# Patient Record
Sex: MALE | Race: BLACK OR AFRICAN AMERICAN | NOT HISPANIC OR LATINO | Employment: FULL TIME | URBAN - METROPOLITAN AREA
[De-identification: names, ages, dates, MRNs, and addresses within clinical notes are randomized per-mention and may not be internally consistent; named-entity substitution may affect disease eponyms.]

---

## 2024-06-16 ENCOUNTER — APPOINTMENT (OUTPATIENT)
Dept: CARDIOLOGY | Facility: HOSPITAL | Age: 49
DRG: 282 | End: 2024-06-16
Payer: COMMERCIAL

## 2024-06-16 ENCOUNTER — HOSPITAL ENCOUNTER (INPATIENT)
Facility: HOSPITAL | Age: 49
DRG: 282 | End: 2024-06-16
Attending: STUDENT IN AN ORGANIZED HEALTH CARE EDUCATION/TRAINING PROGRAM | Admitting: INTERNAL MEDICINE
Payer: COMMERCIAL

## 2024-06-16 ENCOUNTER — APPOINTMENT (OUTPATIENT)
Dept: RADIOLOGY | Facility: HOSPITAL | Age: 49
DRG: 282 | End: 2024-06-16
Payer: COMMERCIAL

## 2024-06-16 VITALS
DIASTOLIC BLOOD PRESSURE: 110 MMHG | SYSTOLIC BLOOD PRESSURE: 153 MMHG | BODY MASS INDEX: 30.36 KG/M2 | OXYGEN SATURATION: 99 % | TEMPERATURE: 98.4 F | RESPIRATION RATE: 18 BRPM | WEIGHT: 205 LBS | HEART RATE: 103 BPM | HEIGHT: 69 IN

## 2024-06-16 DIAGNOSIS — E78.2 MIXED HYPERLIPIDEMIA: ICD-10-CM

## 2024-06-16 DIAGNOSIS — R06.02 SOB (SHORTNESS OF BREATH): ICD-10-CM

## 2024-06-16 DIAGNOSIS — R06.00 DYSPNEA, UNSPECIFIED: ICD-10-CM

## 2024-06-16 DIAGNOSIS — I47.10 SVT (SUPRAVENTRICULAR TACHYCARDIA) (CMS-HCC): Primary | ICD-10-CM

## 2024-06-16 DIAGNOSIS — I10 HYPERTENSION, UNSPECIFIED TYPE: ICD-10-CM

## 2024-06-16 DIAGNOSIS — M62.838 MUSCLE SPASM: ICD-10-CM

## 2024-06-16 DIAGNOSIS — R06.09 OTHER FORMS OF DYSPNEA: ICD-10-CM

## 2024-06-16 LAB
ANION GAP SERPL CALC-SCNC: 13 MMOL/L (ref 10–20)
BASOPHILS # BLD AUTO: 0.04 X10*3/UL (ref 0–0.1)
BASOPHILS NFR BLD AUTO: 0.5 %
BNP SERPL-MCNC: 105 PG/ML (ref 0–99)
BUN SERPL-MCNC: 15 MG/DL (ref 6–23)
CALCIUM SERPL-MCNC: 8.3 MG/DL (ref 8.6–10.3)
CARDIAC TROPONIN I PNL SERPL HS: 38 NG/L (ref 0–20)
CARDIAC TROPONIN I PNL SERPL HS: 54 NG/L (ref 0–20)
CARDIAC TROPONIN I PNL SERPL HS: 89 NG/L (ref 0–20)
CHLORIDE SERPL-SCNC: 106 MMOL/L (ref 98–107)
CO2 SERPL-SCNC: 24 MMOL/L (ref 21–32)
CREAT SERPL-MCNC: 1.52 MG/DL (ref 0.5–1.3)
D DIMER PPP FEU-MCNC: 787 NG/ML FEU
EGFRCR SERPLBLD CKD-EPI 2021: 56 ML/MIN/1.73M*2
EOSINOPHIL # BLD AUTO: 0.05 X10*3/UL (ref 0–0.7)
EOSINOPHIL NFR BLD AUTO: 0.7 %
ERYTHROCYTE [DISTWIDTH] IN BLOOD BY AUTOMATED COUNT: 15.9 % (ref 11.5–14.5)
GLUCOSE SERPL-MCNC: 175 MG/DL (ref 74–99)
HCT VFR BLD AUTO: 45.8 % (ref 41–52)
HGB BLD-MCNC: 15.1 G/DL (ref 13.5–17.5)
IMM GRANULOCYTES # BLD AUTO: 0.02 X10*3/UL (ref 0–0.7)
IMM GRANULOCYTES NFR BLD AUTO: 0.3 % (ref 0–0.9)
LYMPHOCYTES # BLD AUTO: 1.6 X10*3/UL (ref 1.2–4.8)
LYMPHOCYTES NFR BLD AUTO: 21.2 %
MAGNESIUM SERPL-MCNC: 1.8 MG/DL (ref 1.6–2.4)
MCH RBC QN AUTO: 29.7 PG (ref 26–34)
MCHC RBC AUTO-ENTMCNC: 33 G/DL (ref 32–36)
MCV RBC AUTO: 90 FL (ref 80–100)
MONOCYTES # BLD AUTO: 0.52 X10*3/UL (ref 0.1–1)
MONOCYTES NFR BLD AUTO: 6.9 %
NEUTROPHILS # BLD AUTO: 5.33 X10*3/UL (ref 1.2–7.7)
NEUTROPHILS NFR BLD AUTO: 70.4 %
NRBC BLD-RTO: 0 /100 WBCS (ref 0–0)
PHOSPHATE SERPL-MCNC: 3.3 MG/DL (ref 2.5–4.9)
PLATELET # BLD AUTO: 252 X10*3/UL (ref 150–450)
POTASSIUM SERPL-SCNC: 4.1 MMOL/L (ref 3.5–5.3)
RBC # BLD AUTO: 5.08 X10*6/UL (ref 4.5–5.9)
SODIUM SERPL-SCNC: 139 MMOL/L (ref 136–145)
TSH SERPL-ACNC: 1.56 MIU/L (ref 0.44–3.98)
WBC # BLD AUTO: 7.6 X10*3/UL (ref 4.4–11.3)

## 2024-06-16 PROCEDURE — 83880 ASSAY OF NATRIURETIC PEPTIDE: CPT | Performed by: STUDENT IN AN ORGANIZED HEALTH CARE EDUCATION/TRAINING PROGRAM

## 2024-06-16 PROCEDURE — 2500000004 HC RX 250 GENERAL PHARMACY W/ HCPCS (ALT 636 FOR OP/ED): Performed by: STUDENT IN AN ORGANIZED HEALTH CARE EDUCATION/TRAINING PROGRAM

## 2024-06-16 PROCEDURE — 85025 COMPLETE CBC W/AUTO DIFF WBC: CPT | Performed by: STUDENT IN AN ORGANIZED HEALTH CARE EDUCATION/TRAINING PROGRAM

## 2024-06-16 PROCEDURE — 36415 COLL VENOUS BLD VENIPUNCTURE: CPT | Performed by: STUDENT IN AN ORGANIZED HEALTH CARE EDUCATION/TRAINING PROGRAM

## 2024-06-16 PROCEDURE — 2500000004 HC RX 250 GENERAL PHARMACY W/ HCPCS (ALT 636 FOR OP/ED): Performed by: NURSE PRACTITIONER

## 2024-06-16 PROCEDURE — 2500000002 HC RX 250 W HCPCS SELF ADMINISTERED DRUGS (ALT 637 FOR MEDICARE OP, ALT 636 FOR OP/ED): Performed by: NURSE PRACTITIONER

## 2024-06-16 PROCEDURE — 2500000001 HC RX 250 WO HCPCS SELF ADMINISTERED DRUGS (ALT 637 FOR MEDICARE OP): Performed by: NURSE PRACTITIONER

## 2024-06-16 PROCEDURE — 96372 THER/PROPH/DIAG INJ SC/IM: CPT | Performed by: INTERNAL MEDICINE

## 2024-06-16 PROCEDURE — 80048 BASIC METABOLIC PNL TOTAL CA: CPT | Performed by: STUDENT IN AN ORGANIZED HEALTH CARE EDUCATION/TRAINING PROGRAM

## 2024-06-16 PROCEDURE — 2500000001 HC RX 250 WO HCPCS SELF ADMINISTERED DRUGS (ALT 637 FOR MEDICARE OP): Performed by: STUDENT IN AN ORGANIZED HEALTH CARE EDUCATION/TRAINING PROGRAM

## 2024-06-16 PROCEDURE — 96361 HYDRATE IV INFUSION ADD-ON: CPT

## 2024-06-16 PROCEDURE — 84484 ASSAY OF TROPONIN QUANT: CPT | Performed by: NURSE PRACTITIONER

## 2024-06-16 PROCEDURE — 71046 X-RAY EXAM CHEST 2 VIEWS: CPT | Performed by: RADIOLOGY

## 2024-06-16 PROCEDURE — 96374 THER/PROPH/DIAG INJ IV PUSH: CPT | Mod: 59

## 2024-06-16 PROCEDURE — 83735 ASSAY OF MAGNESIUM: CPT | Performed by: STUDENT IN AN ORGANIZED HEALTH CARE EDUCATION/TRAINING PROGRAM

## 2024-06-16 PROCEDURE — 2500000001 HC RX 250 WO HCPCS SELF ADMINISTERED DRUGS (ALT 637 FOR MEDICARE OP): Performed by: INTERNAL MEDICINE

## 2024-06-16 PROCEDURE — 84100 ASSAY OF PHOSPHORUS: CPT | Performed by: STUDENT IN AN ORGANIZED HEALTH CARE EDUCATION/TRAINING PROGRAM

## 2024-06-16 PROCEDURE — 99222 1ST HOSP IP/OBS MODERATE 55: CPT | Performed by: INTERNAL MEDICINE

## 2024-06-16 PROCEDURE — 2500000004 HC RX 250 GENERAL PHARMACY W/ HCPCS (ALT 636 FOR OP/ED): Performed by: INTERNAL MEDICINE

## 2024-06-16 PROCEDURE — 84484 ASSAY OF TROPONIN QUANT: CPT | Performed by: STUDENT IN AN ORGANIZED HEALTH CARE EDUCATION/TRAINING PROGRAM

## 2024-06-16 PROCEDURE — 85379 FIBRIN DEGRADATION QUANT: CPT | Performed by: NURSE PRACTITIONER

## 2024-06-16 PROCEDURE — 99285 EMERGENCY DEPT VISIT HI MDM: CPT

## 2024-06-16 PROCEDURE — 84443 ASSAY THYROID STIM HORMONE: CPT | Performed by: STUDENT IN AN ORGANIZED HEALTH CARE EDUCATION/TRAINING PROGRAM

## 2024-06-16 PROCEDURE — 36415 COLL VENOUS BLD VENIPUNCTURE: CPT | Performed by: INTERNAL MEDICINE

## 2024-06-16 PROCEDURE — 93005 ELECTROCARDIOGRAM TRACING: CPT

## 2024-06-16 PROCEDURE — 1100000001 HC PRIVATE ROOM DAILY

## 2024-06-16 PROCEDURE — 71046 X-RAY EXAM CHEST 2 VIEWS: CPT

## 2024-06-16 RX ORDER — CLONIDINE HYDROCHLORIDE 0.2 MG/1
0.2 TABLET ORAL EVERY 8 HOURS SCHEDULED
Status: DISCONTINUED | OUTPATIENT
Start: 2024-06-16 | End: 2024-06-18 | Stop reason: HOSPADM

## 2024-06-16 RX ORDER — AMLODIPINE BESYLATE 10 MG/1
10 TABLET ORAL DAILY
Status: DISCONTINUED | OUTPATIENT
Start: 2024-06-16 | End: 2024-06-16

## 2024-06-16 RX ORDER — DILTIAZEM HYDROCHLORIDE 120 MG/1
240 CAPSULE, COATED, EXTENDED RELEASE ORAL DAILY
Status: DISCONTINUED | OUTPATIENT
Start: 2024-06-17 | End: 2024-06-18 | Stop reason: HOSPADM

## 2024-06-16 RX ORDER — LOSARTAN POTASSIUM 50 MG/1
50 TABLET ORAL DAILY
Status: DISCONTINUED | OUTPATIENT
Start: 2024-06-16 | End: 2024-06-17

## 2024-06-16 RX ORDER — NAPROXEN SODIUM 220 MG/1
324 TABLET, FILM COATED ORAL ONCE
Status: COMPLETED | OUTPATIENT
Start: 2024-06-16 | End: 2024-06-16

## 2024-06-16 RX ORDER — ACETAMINOPHEN 160 MG/5ML
650 SOLUTION ORAL EVERY 4 HOURS PRN
Status: DISCONTINUED | OUTPATIENT
Start: 2024-06-16 | End: 2024-06-18 | Stop reason: HOSPADM

## 2024-06-16 RX ORDER — PANTOPRAZOLE SODIUM 40 MG/10ML
40 INJECTION, POWDER, LYOPHILIZED, FOR SOLUTION INTRAVENOUS
Status: DISCONTINUED | OUTPATIENT
Start: 2024-06-16 | End: 2024-06-18 | Stop reason: HOSPADM

## 2024-06-16 RX ORDER — ATORVASTATIN CALCIUM 40 MG/1
40 TABLET, FILM COATED ORAL NIGHTLY
Status: DISCONTINUED | OUTPATIENT
Start: 2024-06-16 | End: 2024-06-16

## 2024-06-16 RX ORDER — ONDANSETRON 4 MG/1
4 TABLET, FILM COATED ORAL EVERY 8 HOURS PRN
Status: DISCONTINUED | OUTPATIENT
Start: 2024-06-16 | End: 2024-06-18 | Stop reason: HOSPADM

## 2024-06-16 RX ORDER — HYDRALAZINE HYDROCHLORIDE 20 MG/ML
10 INJECTION INTRAMUSCULAR; INTRAVENOUS ONCE
Status: COMPLETED | OUTPATIENT
Start: 2024-06-16 | End: 2024-06-16

## 2024-06-16 RX ORDER — ENOXAPARIN SODIUM 100 MG/ML
40 INJECTION SUBCUTANEOUS EVERY 24 HOURS
Status: DISCONTINUED | OUTPATIENT
Start: 2024-06-16 | End: 2024-06-18 | Stop reason: HOSPADM

## 2024-06-16 RX ORDER — SPIRONOLACTONE 25 MG/1
25 TABLET ORAL DAILY
Status: DISCONTINUED | OUTPATIENT
Start: 2024-06-16 | End: 2024-06-18 | Stop reason: HOSPADM

## 2024-06-16 RX ORDER — HYDRALAZINE HYDROCHLORIDE 20 MG/ML
10 INJECTION INTRAMUSCULAR; INTRAVENOUS EVERY 6 HOURS PRN
Status: DISCONTINUED | OUTPATIENT
Start: 2024-06-16 | End: 2024-06-18 | Stop reason: HOSPADM

## 2024-06-16 RX ORDER — NAPROXEN SODIUM 220 MG/1
81 TABLET, FILM COATED ORAL DAILY
Status: DISCONTINUED | OUTPATIENT
Start: 2024-06-16 | End: 2024-06-16

## 2024-06-16 RX ORDER — ONDANSETRON HYDROCHLORIDE 2 MG/ML
4 INJECTION, SOLUTION INTRAVENOUS EVERY 8 HOURS PRN
Status: DISCONTINUED | OUTPATIENT
Start: 2024-06-16 | End: 2024-06-18 | Stop reason: HOSPADM

## 2024-06-16 RX ORDER — PANTOPRAZOLE SODIUM 40 MG/1
40 TABLET, DELAYED RELEASE ORAL
Status: DISCONTINUED | OUTPATIENT
Start: 2024-06-16 | End: 2024-06-18 | Stop reason: HOSPADM

## 2024-06-16 RX ORDER — ACETAMINOPHEN 650 MG/1
650 SUPPOSITORY RECTAL EVERY 4 HOURS PRN
Status: DISCONTINUED | OUTPATIENT
Start: 2024-06-16 | End: 2024-06-18 | Stop reason: HOSPADM

## 2024-06-16 RX ORDER — ACETAMINOPHEN 325 MG/1
650 TABLET ORAL EVERY 4 HOURS PRN
Status: DISCONTINUED | OUTPATIENT
Start: 2024-06-16 | End: 2024-06-18 | Stop reason: HOSPADM

## 2024-06-16 RX ADMIN — ENOXAPARIN SODIUM 40 MG: 40 INJECTION SUBCUTANEOUS at 05:36

## 2024-06-16 RX ADMIN — AMLODIPINE BESYLATE 10 MG: 10 TABLET ORAL at 08:47

## 2024-06-16 RX ADMIN — PANTOPRAZOLE SODIUM 40 MG: 40 TABLET, DELAYED RELEASE ORAL at 06:36

## 2024-06-16 RX ADMIN — HYDRALAZINE HYDROCHLORIDE 10 MG: 20 INJECTION INTRAMUSCULAR; INTRAVENOUS at 17:23

## 2024-06-16 RX ADMIN — SODIUM CHLORIDE, POTASSIUM CHLORIDE, SODIUM LACTATE AND CALCIUM CHLORIDE 1000 ML: 600; 310; 30; 20 INJECTION, SOLUTION INTRAVENOUS at 01:18

## 2024-06-16 RX ADMIN — ASPIRIN 81 MG 324 MG: 81 TABLET ORAL at 02:26

## 2024-06-16 RX ADMIN — CLONIDINE HYDROCHLORIDE 0.2 MG: 0.2 TABLET ORAL at 20:43

## 2024-06-16 RX ADMIN — LOSARTAN POTASSIUM 50 MG: 50 TABLET, FILM COATED ORAL at 12:00

## 2024-06-16 RX ADMIN — HYDRALAZINE HYDROCHLORIDE 10 MG: 20 INJECTION INTRAMUSCULAR; INTRAVENOUS at 08:47

## 2024-06-16 RX ADMIN — SPIRONOLACTONE 25 MG: 25 TABLET ORAL at 12:00

## 2024-06-16 SDOH — SOCIAL STABILITY: SOCIAL INSECURITY: ARE THERE ANY APPARENT SIGNS OF INJURIES/BEHAVIORS THAT COULD BE RELATED TO ABUSE/NEGLECT?: NO

## 2024-06-16 SDOH — SOCIAL STABILITY: SOCIAL INSECURITY: HAVE YOU HAD ANY THOUGHTS OF HARMING ANYONE ELSE?: NO

## 2024-06-16 SDOH — ECONOMIC STABILITY: INCOME INSECURITY: IN THE LAST 12 MONTHS, WAS THERE A TIME WHEN YOU WERE NOT ABLE TO PAY THE MORTGAGE OR RENT ON TIME?: NO

## 2024-06-16 SDOH — SOCIAL STABILITY: SOCIAL INSECURITY: DO YOU FEEL UNSAFE GOING BACK TO THE PLACE WHERE YOU ARE LIVING?: NO

## 2024-06-16 SDOH — ECONOMIC STABILITY: HOUSING INSECURITY
IN THE LAST 12 MONTHS, WAS THERE A TIME WHEN YOU DID NOT HAVE A STEADY PLACE TO SLEEP OR SLEPT IN A SHELTER (INCLUDING NOW)?: NO

## 2024-06-16 SDOH — ECONOMIC STABILITY: HOUSING INSECURITY: IN THE LAST 12 MONTHS, HOW MANY PLACES HAVE YOU LIVED?: 1

## 2024-06-16 SDOH — ECONOMIC STABILITY: INCOME INSECURITY: HOW HARD IS IT FOR YOU TO PAY FOR THE VERY BASICS LIKE FOOD, HOUSING, MEDICAL CARE, AND HEATING?: NOT HARD AT ALL

## 2024-06-16 SDOH — SOCIAL STABILITY: SOCIAL INSECURITY: ABUSE: ADULT

## 2024-06-16 SDOH — ECONOMIC STABILITY: TRANSPORTATION INSECURITY
IN THE PAST 12 MONTHS, HAS THE LACK OF TRANSPORTATION KEPT YOU FROM MEDICAL APPOINTMENTS OR FROM GETTING MEDICATIONS?: NO

## 2024-06-16 SDOH — SOCIAL STABILITY: SOCIAL INSECURITY: HAVE YOU HAD THOUGHTS OF HARMING ANYONE ELSE?: NO

## 2024-06-16 SDOH — SOCIAL STABILITY: SOCIAL INSECURITY: DOES ANYONE TRY TO KEEP YOU FROM HAVING/CONTACTING OTHER FRIENDS OR DOING THINGS OUTSIDE YOUR HOME?: NO

## 2024-06-16 SDOH — ECONOMIC STABILITY: TRANSPORTATION INSECURITY
IN THE PAST 12 MONTHS, HAS LACK OF TRANSPORTATION KEPT YOU FROM MEETINGS, WORK, OR FROM GETTING THINGS NEEDED FOR DAILY LIVING?: NO

## 2024-06-16 SDOH — SOCIAL STABILITY: SOCIAL INSECURITY: WERE YOU ABLE TO COMPLETE ALL THE BEHAVIORAL HEALTH SCREENINGS?: YES

## 2024-06-16 SDOH — SOCIAL STABILITY: SOCIAL INSECURITY: ARE YOU OR HAVE YOU BEEN THREATENED OR ABUSED PHYSICALLY, EMOTIONALLY, OR SEXUALLY BY ANYONE?: NO

## 2024-06-16 SDOH — SOCIAL STABILITY: SOCIAL INSECURITY: HAS ANYONE EVER THREATENED TO HURT YOUR FAMILY OR YOUR PETS?: NO

## 2024-06-16 SDOH — SOCIAL STABILITY: SOCIAL INSECURITY: DO YOU FEEL ANYONE HAS EXPLOITED OR TAKEN ADVANTAGE OF YOU FINANCIALLY OR OF YOUR PERSONAL PROPERTY?: NO

## 2024-06-16 ASSESSMENT — PAIN SCALES - GENERAL
PAINLEVEL_OUTOF10: 0 - NO PAIN
PAINLEVEL_OUTOF10: 10 - WORST POSSIBLE PAIN
PAINLEVEL_OUTOF10: 0 - NO PAIN

## 2024-06-16 ASSESSMENT — ACTIVITIES OF DAILY LIVING (ADL)
WALKS IN HOME: INDEPENDENT
PATIENT'S MEMORY ADEQUATE TO SAFELY COMPLETE DAILY ACTIVITIES?: YES
HEARING - LEFT EAR: FUNCTIONAL
ADEQUATE_TO_COMPLETE_ADL: YES
BATHING: INDEPENDENT
TOILETING: INDEPENDENT
ASSISTIVE_DEVICE: EYEGLASSES
HEARING - RIGHT EAR: FUNCTIONAL
FEEDING YOURSELF: INDEPENDENT
JUDGMENT_ADEQUATE_SAFELY_COMPLETE_DAILY_ACTIVITIES: YES
DRESSING YOURSELF: INDEPENDENT
GROOMING: INDEPENDENT

## 2024-06-16 ASSESSMENT — COGNITIVE AND FUNCTIONAL STATUS - GENERAL
DAILY ACTIVITIY SCORE: 24
DAILY ACTIVITIY SCORE: 24
MOBILITY SCORE: 24
DAILY ACTIVITIY SCORE: 24
PATIENT BASELINE BEDBOUND: NO

## 2024-06-16 ASSESSMENT — LIFESTYLE VARIABLES
AUDIT TOTAL SCORE: 5
AUDIT-C TOTAL SCORE: 5
HOW OFTEN DURING THE LAST YEAR HAVE YOU FOUND THAT YOU WERE NOT ABLE TO STOP DRINKING ONCE YOU HAD STARTED: NEVER
HOW OFTEN DO YOU HAVE 6 OR MORE DRINKS ON ONE OCCASION: MONTHLY
HAS A RELATIVE, FRIEND, DOCTOR, OR ANOTHER HEALTH PROFESSIONAL EXPRESSED CONCERN ABOUT YOUR DRINKING OR SUGGESTED YOU CUT DOWN: NO
HOW OFTEN DO YOU HAVE A DRINK CONTAINING ALCOHOL: 2-3 TIMES A WEEK
HAVE YOU OR SOMEONE ELSE BEEN INJURED AS A RESULT OF YOUR DRINKING: NO
HOW OFTEN DURING THE LAST YEAR HAVE YOU FAILED TO DO WHAT WAS NORMALLY EXPECTED FROM YOU BECAUSE OF DRINKING: NEVER
HOW MANY STANDARD DRINKS CONTAINING ALCOHOL DO YOU HAVE ON A TYPICAL DAY: 1 OR 2
SKIP TO QUESTIONS 9-10: 0
HOW OFTEN DURING THE LAST YEAR HAVE YOU BEEN UNABLE TO REMEMBER WHAT HAPPENED THE NIGHT BEFORE BECAUSE YOU HAD BEEN DRINKING: NEVER
HOW OFTEN DURING THE LAST YEAR HAVE YOU NEEDED AN ALCOHOLIC DRINK FIRST THING IN THE MORNING TO GET YOURSELF GOING AFTER A NIGHT OF HEAVY DRINKING: NEVER
AUDIT-C TOTAL SCORE: 5
HOW OFTEN DURING THE LAST YEAR HAVE YOU HAD A FEELING OF GUILT OR REMORSE AFTER DRINKING: NEVER
AUDIT TOTAL SCORE: 0

## 2024-06-16 ASSESSMENT — COLUMBIA-SUICIDE SEVERITY RATING SCALE - C-SSRS
6. HAVE YOU EVER DONE ANYTHING, STARTED TO DO ANYTHING, OR PREPARED TO DO ANYTHING TO END YOUR LIFE?: NO
1. IN THE PAST MONTH, HAVE YOU WISHED YOU WERE DEAD OR WISHED YOU COULD GO TO SLEEP AND NOT WAKE UP?: NO
2. HAVE YOU ACTUALLY HAD ANY THOUGHTS OF KILLING YOURSELF?: NO

## 2024-06-16 ASSESSMENT — PATIENT HEALTH QUESTIONNAIRE - PHQ9
SUM OF ALL RESPONSES TO PHQ9 QUESTIONS 1 & 2: 0
2. FEELING DOWN, DEPRESSED OR HOPELESS: NOT AT ALL
1. LITTLE INTEREST OR PLEASURE IN DOING THINGS: NOT AT ALL

## 2024-06-16 ASSESSMENT — PAIN - FUNCTIONAL ASSESSMENT
PAIN_FUNCTIONAL_ASSESSMENT: 0-10

## 2024-06-16 ASSESSMENT — ENCOUNTER SYMPTOMS: SHORTNESS OF BREATH: 1

## 2024-06-16 NOTE — CARE PLAN
The patient's goals for the shift include      The clinical goals for the shift include Pt will remain safe throughout the shift    Over the shift, the patient did make progress toward the following goals.   Problem: Fall/Injury  Goal: Not fall by end of shift  Outcome: Progressing     Problem: Fall/Injury  Goal: Verbalize understanding of personal risk factors for fall in the hospital  Outcome: Progressing     Problem: Fall/Injury  Goal: Use assistive devices by end of the shift  Outcome: Progressing

## 2024-06-16 NOTE — CONSULTS
Inpatient consult to Cardiology  Consult performed by: Evelyn Tolentino, SWAPNIL-CNP  Consult ordered by: Dustin Mccarthy DO  Reason for consult: SVT, chest pain      History Of Present Illness:    Petr Baptiste is a 48 y.o. male with PMHx current smoker, SVT, HTN, JOSUE, polysubstance abuse significant for presenting with SVT .  He reported c/o palpations yesterday at work (works at [employer]) when walking up the stairs. Associated with sob, diaphoresis, midsternal chest pain. Symptoms resolved with rest, drinking cold water after about 15 minutes. Symptoms later recurred later during his shift. Symptoms did not resolve after about a half hour, so he decided to call EMS. EMS arrived and found him to be in SVT , given 6 mg Adenosine with conversion to SR.     He previously followed with Dr. Hooks at [facility], last visit 6/2023. At that time recommended LHC given c/o cp, sob. He was concerned about having a CVA during his LHC so he did not proceed with evaluation. He was lost to follow up. He reported difficulty affording his medications (including several antihypertensives: Losartan 100 mg daily, Metoprolol Tartrate 100 mg BID, Hydralazine 100 mg TID, Imdur 30 mg daily) and c/o cramping with them so he stopped taking them several months ago.   Event monitor worn May 2023 revealed sinus rhythm without any arrhythmias.  Stress test performed June 2023 was negative for ischemia by nuclear imaging.  There were horizontal downsloping 1 mm ST segment depressions in the precordial and inferior leads after Lexiscan infusion.  This was accompanied by chest tightness.  There is relieved with Aminophyllin.       In ED, BMP with elevated Scr 1.52 (1.31 5/2023); . TSH, CBC unremarkable. HsTrop 38/54/87. EKG on admit NSR, possible LAE, q waves in septal leads.  No acute ST ischemic changes.     Cardiac Medications: None prior to admit; off for several months; previously on Losartan 100 mg daily, Metoprolol Tartrate 100 mg  "BID, Hydralazine 100 mg TID, Imdur 30 mg daily    Social history: 1/2 PPD smoker, weekly marijuana, daily etoh (2 tall cans beer)    Pertinent cardiac family history: Heart problems; grandparents, maternal both  in their 30s; sister 33 with HTN, mother with HTN  Father  at age 46, unknown      Past Cardiology Tests (Last 3 Years):  EK24 107:               Echo 5/10/23 [facility]:  CONCLUSIONS:   - Exam indication: Syncope   - The left ventricle is normal in size. There is mild left ventricular   hypertrophy. Left ventricular systolic function is normal. EF = 64 ± 5% (2D   biplane) Grade I left ventricular diastolic dysfunction.   - The right ventricle is normal in size. Right ventricular systolic function is   normal.   - There are no significant valvular abnormalities.   - Estimated right ventricular systolic pressure is likely underestimated due to a   weak or incomplete tricuspid regurgitation signal and is, at least, 29 mmHg   consistent with normal pulmonary artery pressures. Estimated right atrial pressure    is 3 mmHg based on IVC assessment.   - The patient has not had a prior CC echocardiographic exam for comparison.       Electronically signed by David Hooks MD on 5/10/2023 at 3:42:57 PM     Ejection Fractions:  No results found for: \"EF\"  Cath:  No results found for this or any previous visit.    Stress Test:  [facility] 2023  Stress test performed 2023 was negative for ischemia by nuclear imaging. There were horizontal downsloping 1 mm ST segment depressions in the precordial and inferior leads after Lexiscan infusion. This was accompanied by chest tightness. There is relieved with Aminophyllin.      Cardiac Imaging:  No results found for this or any previous visit.      Past Medical History:  He has no past medical history on file.    Past Surgical History:  He has no past surgical history on file.      Social History:  He reports that he has been smoking cigarettes. He has never used " "smokeless tobacco. He reports current drug use. Drug: Marijuana. No history on file for alcohol use.    Family History:  No family history on file.     Allergies:  Penicillins    ROS:  10 point review of systems including (Constitutional, Eyes, ENMT, Respiratory, Cardiac, Gastrointestinal, Neurological, Psychiatric, and Hematologic) was performed and is otherwise negative.    Objective Data:  Last Recorded Vitals:  Vitals:    24 0600 24 0630 24 0700 24 0715   BP: (!) 194/110 (!) 185/129 (!) 171/150 (!) 180/109   BP Location:       Patient Position:       Pulse: 69 87 84 86   Resp: 18 18 18    Temp:       TempSrc:       SpO2: 94% 94% 97% 97%   Weight:       Height:         Medical Gas Therapy: None (Room air)  Weight  Av kg (205 lb)  Min: 93 kg (205 lb)  Max: 93 kg (205 lb)      LABS:  CMP:  Results from last 7 days   Lab Units 24  0113   SODIUM mmol/L 139   POTASSIUM mmol/L 4.1   CHLORIDE mmol/L 106   CO2 mmol/L 24   ANION GAP mmol/L 13   BUN mg/dL 15   CREATININE mg/dL 1.52*   EGFR mL/min/1.73m*2 56*   MAGNESIUM mg/dL 1.80     CBC:  Results from last 7 days   Lab Units 24  0113   WBC AUTO x10*3/uL 7.6   HEMOGLOBIN g/dL 15.1   HEMATOCRIT % 45.8   PLATELETS AUTO x10*3/uL 252   MCV fL 90     COAG:     ABO: No results found for: \"ABO\"  HEME/ENDO:  Results from last 7 days   Lab Units 24  0113   TSH mIU/L 1.56      CARDIAC:   Results from last 7 days   Lab Units 24  0213 24  0113   TROPHS ng/L 54* 38*   BNP pg/mL  --  105*             Last I/O:    Intake/Output Summary (Last 24 hours) at 2024 075  Last data filed at 2024 0218  Gross per 24 hour   Intake 999 ml   Output --   Net 999 ml     Net IO Since Admission: 999 mL [24 0751]      Imaging Results:  No results found.    Inpatient Medications:  Scheduled medications   Medication Dose Route Frequency    enoxaparin  40 mg subcutaneous q24h    pantoprazole  40 mg oral Daily before breakfast    Or "    pantoprazole  40 mg intravenous Daily before breakfast     PRN medications   Medication    acetaminophen    Or    acetaminophen    Or    acetaminophen    ondansetron    Or    ondansetron     Continuous Medications   Medication Dose Last Rate       Outpatient Medications:  No current outpatient medications    Physical Exam:  General:  Patient is awake, alert, and oriented.  Patient is in no acute distress.  HEENT:  Pupils equal and reactive.  Normocephalic.  Moist mucosa.    Neck:  No thyromegaly.  Normal Jugular Venous Pressure.  Cardiovascular:  Regular rate and rhythm.  Normal S1 and S2.  Pulmonary:  Clear to auscultation bilaterally.  Abdomen:  Soft. Non-tender.   Non-distended.  Positive bowel sounds.  Lower Extremities:  2+ pedal pulses. No LE edema.  Neurologic:  Cranial nerves intact.  No focal deficit.   Skin: Skin warm and dry, normal skin turgor.   Psychiatric: Normal affect.     Assessment/Plan   Petr Baptiste is a 48 y.o. male with PMHx current smoker, SVT, uncontrolled HTN (off medications at least 6 months), JOSUE, polysubstance abuse significant for presenting with SVT .  He reported c/o palpations yesterday at work (works at [employer]) when walking up the stairs. Associated with sob, diaphoresis, midsternal chest pain. Symptoms resolved with rest, drinking cold water after about 15 minutes. Symptoms later recurred later during his shift. Symptoms did not resolve after about a half hour, so he decided to call EMS. EMS arrived and found him to be in SVT , given 6 mg Adenosine with conversion to SR.     He previously followed with Dr. Hooks at [facility], last visit 6/2023. At that time recommended [facility] given c/o cp, sob. He was concerned about having a CVA during his LHC so he did not proceed with evaluation. He was lost to follow up. He reported difficulty affording his medications (including several antihypertensives: Losartan 100 mg daily, Metoprolol Tartrate 100 mg BID, Hydralazine 100 mg TID, Imdur  30 mg daily) and c/o cramping with them so he stopped taking them several months ago.   Event monitor worn May 2023 revealed sinus rhythm without any arrhythmias.  Stress test performed June 2023 was negative for ischemia by nuclear imaging.  There were horizontal downsloping 1 mm ST segment depressions in the precordial and inferior leads after Lexiscan infusion.  This was accompanied by chest tightness.  There is relieved with Aminophyllin.       In ED, BMP with elevated Scr 1.52 (1.31 5/2023); . TSH, CBC unremarkable. HsTrop 38/54/89. EKG on admit NSR, possible LAE.  Q waves in septal leads. No acute ST ischemic changes.         #SVT  -EMS rhythm strip reviewed. SVT , ?AVNRT. S/p 6 mg Adenosine IVP by EMS with conversion to SR.   -Now maintaining SR  -Not on any cardiac meds prior to admit  -Event monitor worn May 2023 at [facility] revealed sinus rhythm without any arrhythmias.      #HTN, uncontrolled  #Non-adherence to medications, He reported difficulty affording his medications (including several antihypertensives: Losartan 100 mg daily, Metoprolol Tartrate 100 mg BID, Hydralazine 100 mg TID, Imdur 30 mg daily) and c/o cramping with them so he stopped taking them several months ago.     #Elevated Troponin  #Abnormal EKG  HsTrop 38/54/89. EKG on admit NSR, possible LAE.  Q waves in septal leads. No acute ST ischemic changes.   Noted to have c/o cp during SVT episode, now resolved.  Elevated troponin may be 2/2 to type 2 MI demand/ischemia due to SVT, uncontrolled HTN; however unable to r/o type 1 given significant risk factors for CAD. Nuclear Stress at [facility] 6/2023: negative nuclear images, but noted to have 1 mm ST segment depressions in the precordial and inferior leads after Lexiscan infusion.  This was accompanied by chest tightness, relieved with Aminophyllin.  #CKD, Scr. 1.5 on admit; last lab work >1 year ago (5/2023) Scr 1.3.     Plan:   Start Diltiazem 240 mg daily  Start Losartan 50 mg daily,  Spironolactone 25 mg daily.   Discontinue Amlodipine 10 mg daily.   TTE ordered.   Once BP better controlled, would plan for ischemic evaluation with likely [facility] til at least Tuesday dependent on renal fx, BP.   Lifestyle modification (smoking, etoh cessation, weight loss, diet/exercise). Medication compliance and Cardiology follow up encouarged.  Will need to follow up with Cardiology ( vs [facility] (previously saw Dr. Hooks) within 4 weeks on discharge    Code Status:  Full Code        Evelyn Tolentino, SWAPNIL-CNP    STAFF ADDENDUM:    Both the DANE and I have had a face to face encounter with the patient today. I have examined the patient and edited the documented physical examination as necessary.  I personally reviewed the patient's vital signs, telemetry, recent labs, medications, orders, EKGs, and pertinent cardiac imaging/ echocardiography.  I have reviewed the DANE's encounter note, approve the DANE's documentation and have edited the note to reflect my diagnostic and therapeutic plan.      1.  Paroxysmal SVT  - Possible AVNRT.  Resolved with adenosine.  Long timeframe between episodes (at least 5 years)  - Start diltiazem.  -If recurrent symptoms within short period of time would consider EP consultation for ablation    2.  Hypertensive urgency  - Very poor control.  Noncompliant with medications.  - Propose resuming some medications and altering therapy to include losartan, spironolactone, diltiazem and uptitrate from there.  Suspect he may need 4 or 5 alternative medications  -Plasma metanephrines ordered  - Outpatient renal artery ultrasound recommended as well    3.  Elevated troponin  - Likely type II demand MI in the setting of SVT and hypertensive urgency  - Does have history of ischemic EKG changes on stress test in 2023 and says C was deferred due to severe HTN.  -echo pending  - I would like to do coronary CT angiogram but worried about high dye load in the setting of poorly controlled hypertension  which might cause flash pulmonary edema, therefore would consider left heart catheterization in 2 to 3 days after better blood pressure control.  He may not agree to stay that long and if so would consider outpatient if he will ever agree to it.    Pedro Iraheta, DO

## 2024-06-16 NOTE — ED TRIAGE NOTES
Patient arrives from home via EMS with complaints of elevated heart rate. EMS states patients rate was 205 initially, EMS administered 6 mg adenosine IV. EMS placed 20g IV left AC. Patient NSR-ST upon presentation to ED. Patient states no symptoms at this time. No CP/SOB/dizziness.

## 2024-06-16 NOTE — ED PROVIDER NOTES
HPI:    History provided by: Patient    48-year-old male past medical history of SVT who presents to the emergency department for rapid heart rate.  He began to develop some shortness of breath when he was walking upstairs at work today.  Was severely short of breath to the point where he had to sit down.  After that, he felt like his heart was racing out of his chest.  Prompted workers to call 911.  When EMS arrived, patient was found to be in SVT and was given a dose of adenosine with conversion to normal sinus rhythm after 6 mg of adenosine.  Patient states this happened to him approximately 5 to 10 years ago, and he was discharged from the emergency department and did not get placed on any medications and has not been taking anything.  He denies any chest pain prior to this.  Does state that he has an early family history of dying with multiple family members dying before they were 50 years old.  Unclear what the etiology behind these deaths were.  Has been feeling well prior to today.      ROS: All pertinent positives and negatives reviewed in HPI    PMHx: Reviewed  PSHx: Reviewed  Social: no Etoh, no tobacco, no social drugs  Social Determinants of Health impacting care: NA  Allergies: Reviewed in EMR  FMHx: Noncontributory to patient's chief complaint  Medications: Reviewed        Vital signs and triage note reviewed per nursing documentation    Physical Exam:     GEN: Sitting in no acute distress. Well-appearing, appears stated age  HEAD: Atraumatic, normocephalic  EYES: EOMI. Pupils equal and reactive.   HEENT: MMM. No oropharyngeal erythema, exudates, or uvular deviation.   Neck: Supple, full ROM  CVS: Regular rate and rhythm, no murmurs, gallops, or rubs  PULM: Clear to auscultation bilaterally. No wheezes, rales, rhonchi.   ABD: Soft, nontender to palpation. No rigidity, guarding, or tympany  EXT: +2 radial and DP pulses bilaterally. No pitting edema noted. No varicose veins  NEURO: Alert, keenly  responsive. Moving all 4 extremities spontaneously with normal strength. Normal sensation in all 4 extremities     Emergency Department Course    Medications Ordered: NA    EKG: tachycardic ventricular rate, regular rhythm, normal axis. [place], QRS, and QTC intervals within normal limits. Nonspecific st depressions/t wave changes in inferior leads    Impression: Sinus tachycardia with inferior ST depressions.       MDM    48-year-old male past medical history of SVT who presents to the emergency department for SVT.  He had some exertional dyspnea at work today, and EMS was called because he began to feel severe palpitations.  Was found to be SVT, had adenosine for EMS with conversion to normal sinus rhythm.  He has some nonspecific ST depressions in his inferior leads on his EKG with no previous comparison seen.  Did have some exertional dyspnea, given these ST changes concern for possible cardiac etiology behind the SVT that he had today.  As such, laboratory testing was sent to evaluate for electrolyte derangement or ischemia.  His troponin is mildly elevated at 38 for which she was given aspirin.  No repeat EKG changes from previous.  Also has an elevated creatinine with unclear baseline.  He is not hypoxic, no risk factors for PE so I am not concerned for pulmonary embolism currently.  No severe electrolyte derangement to precipitate an episode of SVT, and normal thyroid testing as well.  BNP very mildly elevated.  Given this patient's elevated troponin level, exertional dyspnea prior to this event occurring, and family history of early death along with EKG changes I do believe he should be admitted to the hospital for cardiac restratification as this could have been the precipitating factor behind his episode of SVT.  He will be admitted in stable condition.       Consultations:    NA    Medications Prescribed:    NA    Disposition:    Admitted    =         Leon Breaux MD  06/16/24 2089

## 2024-06-16 NOTE — H&P
History Of Present Illness  Petr Baptiste is a 48 y.o. male presenting with rapid heart rate. He was walking up stairs today at work and developed shortness of breath that required him to sit down. He then started feeling like his heart was racing and his coworkers to call 911. He was found to be in SVT and converted back to SR after adenosine was given by EMS.  He does endorse he will intermittently become SOB like he did last night, however it goes away. However yesterday he was also having CP when he would breath in and also felt like his heart was racing. He reports when EMS got to the scene his . Denies taking any meds at home     He denies any SOB or chest pain currently.      PMHX: HTN     Past Medical History  History reviewed. No pertinent past medical history.    Surgical History  History reviewed. No pertinent surgical history.     Social History  He reports that he has been smoking cigarettes. He has never used smokeless tobacco. He reports current drug use. Drug: Marijuana. No history on file for alcohol use.    Family History  No family history on file.     Allergies  Penicillins    Review of Systems   Respiratory:  Positive for shortness of breath.    Cardiovascular:  Positive for chest pain.   All other systems reviewed and are negative.       Physical Exam  Constitutional:       Appearance: Normal appearance.   Cardiovascular:      Rate and Rhythm: Normal rate and regular rhythm.      Pulses: Normal pulses.      Heart sounds: Normal heart sounds. No murmur heard.     No gallop.   Pulmonary:      Effort: Pulmonary effort is normal. No respiratory distress.      Breath sounds: Rhonchi present. No wheezing.   Abdominal:      General: Abdomen is flat. Bowel sounds are normal. There is no distension.      Palpations: Abdomen is soft.      Tenderness: There is no abdominal tenderness. There is no guarding.   Musculoskeletal:         General: Normal range of motion.   Skin:     General: Skin is  "warm.      Capillary Refill: Capillary refill takes less than 2 seconds.   Neurological:      Mental Status: He is alert and oriented to person, place, and time.   Psychiatric:         Mood and Affect: Mood normal.         Thought Content: Thought content normal.         Judgment: Judgment normal.          Last Recorded Vitals  Blood pressure (!) 180/109, pulse 86, temperature 36.6 °C (97.9 °F), temperature source Temporal, resp. rate 18, height 1.753 m (5' 9\"), weight 93 kg (205 lb), SpO2 97%.    Relevant Results  Scheduled medications  enoxaparin, 40 mg, subcutaneous, q24h  pantoprazole, 40 mg, oral, Daily before breakfast   Or  pantoprazole, 40 mg, intravenous, Daily before breakfast      Continuous medications     PRN medications  PRN medications: acetaminophen **OR** acetaminophen **OR** acetaminophen, ondansetron **OR** ondansetron  Results for orders placed or performed during the hospital encounter of 06/16/24 (from the past 24 hour(s))   CBC and Auto Differential   Result Value Ref Range    WBC 7.6 4.4 - 11.3 x10*3/uL    nRBC 0.0 0.0 - 0.0 /100 WBCs    RBC 5.08 4.50 - 5.90 x10*6/uL    Hemoglobin 15.1 13.5 - 17.5 g/dL    Hematocrit 45.8 41.0 - 52.0 %    MCV 90 80 - 100 fL    MCH 29.7 26.0 - 34.0 pg    MCHC 33.0 32.0 - 36.0 g/dL    RDW 15.9 (H) 11.5 - 14.5 %    Platelets 252 150 - 450 x10*3/uL    Neutrophils % 70.4 40.0 - 80.0 %    Immature Granulocytes %, Automated 0.3 0.0 - 0.9 %    Lymphocytes % 21.2 13.0 - 44.0 %    Monocytes % 6.9 2.0 - 10.0 %    Eosinophils % 0.7 0.0 - 6.0 %    Basophils % 0.5 0.0 - 2.0 %    Neutrophils Absolute 5.33 1.20 - 7.70 x10*3/uL    Immature Granulocytes Absolute, Automated 0.02 0.00 - 0.70 x10*3/uL    Lymphocytes Absolute 1.60 1.20 - 4.80 x10*3/uL    Monocytes Absolute 0.52 0.10 - 1.00 x10*3/uL    Eosinophils Absolute 0.05 0.00 - 0.70 x10*3/uL    Basophils Absolute 0.04 0.00 - 0.10 x10*3/uL   Basic metabolic panel   Result Value Ref Range    Glucose 175 (H) 74 - 99 mg/dL    " Sodium 139 136 - 145 mmol/L    Potassium 4.1 3.5 - 5.3 mmol/L    Chloride 106 98 - 107 mmol/L    Bicarbonate 24 21 - 32 mmol/L    Anion Gap 13 10 - 20 mmol/L    Urea Nitrogen 15 6 - 23 mg/dL    Creatinine 1.52 (H) 0.50 - 1.30 mg/dL    eGFR 56 (L) >60 mL/min/1.73m*2    Calcium 8.3 (L) 8.6 - 10.3 mg/dL   Phosphorus   Result Value Ref Range    Phosphorus 3.3 2.5 - 4.9 mg/dL   Magnesium   Result Value Ref Range    Magnesium 1.80 1.60 - 2.40 mg/dL   B-Type Natriuretic Peptide   Result Value Ref Range     (H) 0 - 99 pg/mL   TSH with reflex to Free T4 if abnormal   Result Value Ref Range    Thyroid Stimulating Hormone 1.56 0.44 - 3.98 mIU/L   Troponin I, High Sensitivity, Initial   Result Value Ref Range    Troponin I, High Sensitivity 38 (H) 0 - 20 ng/L   Troponin, High Sensitivity, 1 Hour   Result Value Ref Range    Troponin I, High Sensitivity 54 (HH) 0 - 20 ng/L     No results found.       Assessment/Plan   Principal Problem:    SVT (supraventricular tachycardia) ([facility])      Petr Baptiste is a 48 y.o. male presenting with rapid heart rate. He was walking up stairs today at work and developed shortness of breath that required him to sit down. He then started feeling like his heart was racing and his coworkers to call 911. He was found to be in SVT and converted back to SR after adenosine was given by EMS.  He does endorse he will intermittently become SOB like he did last night, however it goes away. However yesterday he was also having CP when he would breath in and also felt like his heart was racing. He reports when EMS got to the scene his . Denies taking any meds at home     He denies any SOB or chest pain currently.      PMHX: HTN     Chest pain/ SVT  -currently SR  -cardiology consulted, appreciate recs  -tele  -ALONSO 38 -> 54 -> 89  -CXR neg   -TSH WNL     HTN  -denies  taking any meds at home  -start amlodipine, trend. Can further adjust/add meds    Renal insufficiency  -creat 1.5 on admit.  Acute vs CKD, does have uncontrolled HTN as risk factor for progression   -creat 1.31 5/14/23   -avoid nephrotoxic agents        DVT prophylaxis:  Lovenox, SCD    DC plan:  DC home when medically stable    Labs/Testing reviewed    Interdisciplinary team rounding completed with hospitalist, nurse, TCC    NP discussed plan and lab/testing results with Dr. Nielson       Upgraded from obs to inpatient, hospitalist to assume care, notified via epic secure chat        I spent 45  minutes in the professional and overall care of this patient.      Monique Carroll, APRN-CNP

## 2024-06-16 NOTE — PROGRESS NOTES
Pharmacy Medication History Review~~~PATIENT STATES HE HASN'T TAKEN ANY MEDICATION IN ALMOST A YEAR DUE TO REACTIONS TO MEDS AND COST~~~~ WAS PREVIOUSLY ON THE FOLLOWING;  AMLODIPINE 10MG- 1 QD  CHLORTHALIDONE 25MG- 1QD  HYDRALAZINE 100MG- 1TID  ISOSORBIDE MONONITRATE ER 30MG- 1QD  LOSARTAN 100MG- 1QD  METOPROLOL TARTRATE 100MG- 1BID  THIAMINE 100MG- 1TID    Petr Baptiste is a 48 y.o. male admitted for SVT (supraventricular tachycardia) (CMS-HCC). Pharmacy reviewed the patient's phbwa-un-wzooisalt medications and allergies for accuracy.    The list below reflectives the updated PTA list. Please review each medication in order reconciliation for additional clarification and justification.  None           Severity Reactions Comments    Penicillins Not Specified Hives             Below are additional concerns with the patient's PTA list.  Patient states he isn't on and medication.    Gabrielle Gomez

## 2024-06-16 NOTE — CARE PLAN
The patient's goals for the shift include      The clinical goals for the shift include Pt will remain safe throughout the shift    Over the shift, the patient did  make progress toward the following goals.

## 2024-06-17 ENCOUNTER — APPOINTMENT (OUTPATIENT)
Dept: RADIOLOGY | Facility: HOSPITAL | Age: 49
DRG: 282 | End: 2024-06-17
Payer: COMMERCIAL

## 2024-06-17 ENCOUNTER — APPOINTMENT (OUTPATIENT)
Dept: CARDIOLOGY | Facility: HOSPITAL | Age: 49
DRG: 282 | End: 2024-06-17
Payer: COMMERCIAL

## 2024-06-17 PROBLEM — R06.02 SOB (SHORTNESS OF BREATH): Status: ACTIVE | Noted: 2024-06-17

## 2024-06-17 LAB
ANION GAP SERPL CALC-SCNC: 14 MMOL/L (ref 10–20)
AORTIC VALVE MEAN GRADIENT: 2.7 MMHG
AORTIC VALVE PEAK VELOCITY: 1.18 M/S
AV PEAK GRADIENT: 5.6 MMHG
AVA (PEAK VEL): 1.87 CM2
AVA (VTI): 1.95 CM2
BUN SERPL-MCNC: 11 MG/DL (ref 6–23)
CALCIUM SERPL-MCNC: 9.6 MG/DL (ref 8.6–10.3)
CHLORIDE SERPL-SCNC: 102 MMOL/L (ref 98–107)
CHOLEST SERPL-MCNC: 248 MG/DL (ref 0–199)
CHOLESTEROL/HDL RATIO: 4.4
CO2 SERPL-SCNC: 25 MMOL/L (ref 21–32)
CREAT SERPL-MCNC: 1.24 MG/DL (ref 0.5–1.3)
EGFRCR SERPLBLD CKD-EPI 2021: 71 ML/MIN/1.73M*2
EJECTION FRACTION APICAL 4 CHAMBER: 47.2
ERYTHROCYTE [DISTWIDTH] IN BLOOD BY AUTOMATED COUNT: 15.5 % (ref 11.5–14.5)
EST. AVERAGE GLUCOSE BLD GHB EST-MCNC: 123 MG/DL
GLOBAL LONGITUDINAL STRAIN: 12.3 %
GLUCOSE SERPL-MCNC: 97 MG/DL (ref 74–99)
HBA1C MFR BLD: 5.9 %
HCT VFR BLD AUTO: 47.2 % (ref 41–52)
HDLC SERPL-MCNC: 56.7 MG/DL
HGB BLD-MCNC: 16.2 G/DL (ref 13.5–17.5)
LDLC SERPL CALC-MCNC: 174 MG/DL
LEFT ATRIUM VOLUME AREA LENGTH INDEX BSA: 28.8 ML/M2
LEFT VENTRICLE INTERNAL DIMENSION DIASTOLE: 4.04 CM (ref 3.5–6)
LEFT VENTRICULAR OUTFLOW TRACT DIAMETER: 1.78 CM
LV EJECTION FRACTION BIPLANE: 50 %
MCH RBC QN AUTO: 30.3 PG (ref 26–34)
MCHC RBC AUTO-ENTMCNC: 34.3 G/DL (ref 32–36)
MCV RBC AUTO: 88 FL (ref 80–100)
MITRAL VALVE E/A RATIO: 1.07
MITRAL VALVE E/E' RATIO: 11.99
NON HDL CHOLESTEROL: 191 MG/DL (ref 0–149)
NRBC BLD-RTO: 0 /100 WBCS (ref 0–0)
PLATELET # BLD AUTO: 257 X10*3/UL (ref 150–450)
POTASSIUM SERPL-SCNC: 3.7 MMOL/L (ref 3.5–5.3)
RBC # BLD AUTO: 5.35 X10*6/UL (ref 4.5–5.9)
RIGHT VENTRICLE FREE WALL PEAK S': 12 CM/S
RIGHT VENTRICLE PEAK SYSTOLIC PRESSURE: 39.6 MMHG
SODIUM SERPL-SCNC: 137 MMOL/L (ref 136–145)
TRICUSPID ANNULAR PLANE SYSTOLIC EXCURSION: 1.5 CM
TRIGL SERPL-MCNC: 89 MG/DL (ref 0–149)
VLDL: 18 MG/DL (ref 0–40)
WBC # BLD AUTO: 7.4 X10*3/UL (ref 4.4–11.3)

## 2024-06-17 PROCEDURE — 2500000001 HC RX 250 WO HCPCS SELF ADMINISTERED DRUGS (ALT 637 FOR MEDICARE OP): Performed by: INTERNAL MEDICINE

## 2024-06-17 PROCEDURE — 78830 RP LOCLZJ TUM SPECT W/CT 1: CPT

## 2024-06-17 PROCEDURE — 99233 SBSQ HOSP IP/OBS HIGH 50: CPT | Performed by: INTERNAL MEDICINE

## 2024-06-17 PROCEDURE — 2500000002 HC RX 250 W HCPCS SELF ADMINISTERED DRUGS (ALT 637 FOR MEDICARE OP, ALT 636 FOR OP/ED): Performed by: NURSE PRACTITIONER

## 2024-06-17 PROCEDURE — 36415 COLL VENOUS BLD VENIPUNCTURE: CPT | Performed by: NURSE PRACTITIONER

## 2024-06-17 PROCEDURE — 78830 RP LOCLZJ TUM SPECT W/CT 1: CPT | Performed by: STUDENT IN AN ORGANIZED HEALTH CARE EDUCATION/TRAINING PROGRAM

## 2024-06-17 PROCEDURE — 2500000001 HC RX 250 WO HCPCS SELF ADMINISTERED DRUGS (ALT 637 FOR MEDICARE OP): Performed by: NURSE PRACTITIONER

## 2024-06-17 PROCEDURE — 75574 CT ANGIO HRT W/3D IMAGE: CPT

## 2024-06-17 PROCEDURE — 3430000001 HC RX 343 DIAGNOSTIC RADIOPHARMACEUTICALS: Performed by: INTERNAL MEDICINE

## 2024-06-17 PROCEDURE — 83036 HEMOGLOBIN GLYCOSYLATED A1C: CPT | Mod: AHULAB | Performed by: INTERNAL MEDICINE

## 2024-06-17 PROCEDURE — 2500000004 HC RX 250 GENERAL PHARMACY W/ HCPCS (ALT 636 FOR OP/ED): Performed by: NURSE PRACTITIONER

## 2024-06-17 PROCEDURE — 2500000004 HC RX 250 GENERAL PHARMACY W/ HCPCS (ALT 636 FOR OP/ED): Performed by: INTERNAL MEDICINE

## 2024-06-17 PROCEDURE — A9540 TC99M MAA: HCPCS | Performed by: INTERNAL MEDICINE

## 2024-06-17 PROCEDURE — 80061 LIPID PANEL: CPT | Performed by: INTERNAL MEDICINE

## 2024-06-17 PROCEDURE — 2550000001 HC RX 255 CONTRASTS: Performed by: INTERNAL MEDICINE

## 2024-06-17 PROCEDURE — 1200000002 HC GENERAL ROOM WITH TELEMETRY DAILY

## 2024-06-17 PROCEDURE — 93306 TTE W/DOPPLER COMPLETE: CPT

## 2024-06-17 PROCEDURE — 75574 CT ANGIO HRT W/3D IMAGE: CPT | Performed by: INTERNAL MEDICINE

## 2024-06-17 PROCEDURE — 80048 BASIC METABOLIC PNL TOTAL CA: CPT | Performed by: NURSE PRACTITIONER

## 2024-06-17 PROCEDURE — 85027 COMPLETE CBC AUTOMATED: CPT | Performed by: NURSE PRACTITIONER

## 2024-06-17 PROCEDURE — 93306 TTE W/DOPPLER COMPLETE: CPT | Performed by: INTERNAL MEDICINE

## 2024-06-17 RX ORDER — METOPROLOL TARTRATE 1 MG/ML
5 INJECTION, SOLUTION INTRAVENOUS ONCE AS NEEDED
Status: DISCONTINUED | OUTPATIENT
Start: 2024-06-17 | End: 2024-06-18

## 2024-06-17 RX ORDER — NITROGLYCERIN 0.4 MG/1
0.8 TABLET SUBLINGUAL ONCE
Status: COMPLETED | OUTPATIENT
Start: 2024-06-17 | End: 2024-06-17

## 2024-06-17 RX ORDER — CYCLOBENZAPRINE HCL 5 MG
5 TABLET ORAL 3 TIMES DAILY
Status: DISCONTINUED | OUTPATIENT
Start: 2024-06-17 | End: 2024-06-18 | Stop reason: HOSPADM

## 2024-06-17 RX ORDER — METOPROLOL TARTRATE 50 MG/1
100 TABLET ORAL ONCE AS NEEDED
Status: COMPLETED | OUTPATIENT
Start: 2024-06-17 | End: 2024-06-17

## 2024-06-17 RX ORDER — METOPROLOL TARTRATE 50 MG/1
100 TABLET ORAL ONCE
Status: COMPLETED | OUTPATIENT
Start: 2024-06-17 | End: 2024-06-17

## 2024-06-17 RX ORDER — LORAZEPAM 2 MG/ML
0.5 INJECTION INTRAMUSCULAR EVERY 5 MIN PRN
Status: DISCONTINUED | OUTPATIENT
Start: 2024-06-17 | End: 2024-06-18 | Stop reason: HOSPADM

## 2024-06-17 RX ORDER — METOPROLOL TARTRATE 1 MG/ML
5 INJECTION, SOLUTION INTRAVENOUS ONCE
Status: DISCONTINUED | OUTPATIENT
Start: 2024-06-17 | End: 2024-06-18

## 2024-06-17 RX ORDER — LOSARTAN POTASSIUM 50 MG/1
100 TABLET ORAL DAILY
Status: DISCONTINUED | OUTPATIENT
Start: 2024-06-17 | End: 2024-06-18 | Stop reason: HOSPADM

## 2024-06-17 RX ADMIN — PANTOPRAZOLE SODIUM 40 MG: 40 TABLET, DELAYED RELEASE ORAL at 06:34

## 2024-06-17 RX ADMIN — NITROGLYCERIN 0.8 MG: 0.4 TABLET SUBLINGUAL at 16:07

## 2024-06-17 RX ADMIN — ENOXAPARIN SODIUM 40 MG: 40 INJECTION SUBCUTANEOUS at 06:34

## 2024-06-17 RX ADMIN — LOSARTAN POTASSIUM 100 MG: 50 TABLET, FILM COATED ORAL at 11:23

## 2024-06-17 RX ADMIN — METOPROLOL TARTRATE 100 MG: 50 TABLET, FILM COATED ORAL at 11:22

## 2024-06-17 RX ADMIN — CYCLOBENZAPRINE HYDROCHLORIDE 5 MG: 5 TABLET, FILM COATED ORAL at 21:04

## 2024-06-17 RX ADMIN — CLONIDINE HYDROCHLORIDE 0.2 MG: 0.2 TABLET ORAL at 06:34

## 2024-06-17 RX ADMIN — DILTIAZEM HYDROCHLORIDE 240 MG: 120 CAPSULE, EXTENDED RELEASE ORAL at 08:16

## 2024-06-17 RX ADMIN — METOPROLOL TARTRATE 100 MG: 50 TABLET, FILM COATED ORAL at 13:01

## 2024-06-17 RX ADMIN — CLONIDINE HYDROCHLORIDE 0.2 MG: 0.2 TABLET ORAL at 15:03

## 2024-06-17 RX ADMIN — KIT FOR THE PREPARATION OF TECHNETIUM TC 99M ALBUMIN AGGREGATED 4.3 MILLICURIE: 2.5 INJECTION, POWDER, FOR SOLUTION INTRAVENOUS at 09:18

## 2024-06-17 RX ADMIN — CLONIDINE HYDROCHLORIDE 0.2 MG: 0.2 TABLET ORAL at 21:04

## 2024-06-17 RX ADMIN — IOHEXOL 75 ML: 350 INJECTION, SOLUTION INTRAVENOUS at 16:07

## 2024-06-17 RX ADMIN — SPIRONOLACTONE 25 MG: 25 TABLET ORAL at 08:16

## 2024-06-17 RX ADMIN — HYDRALAZINE HYDROCHLORIDE 10 MG: 20 INJECTION INTRAMUSCULAR; INTRAVENOUS at 04:40

## 2024-06-17 SDOH — HEALTH STABILITY: MENTAL HEALTH: HOW OFTEN DO YOU HAVE A DRINK CONTAINING ALCOHOL?: MONTHLY OR LESS

## 2024-06-17 SDOH — ECONOMIC STABILITY: INCOME INSECURITY: IN THE LAST 12 MONTHS, WAS THERE A TIME WHEN YOU WERE NOT ABLE TO PAY THE MORTGAGE OR RENT ON TIME?: NO

## 2024-06-17 SDOH — HEALTH STABILITY: MENTAL HEALTH: HOW MANY STANDARD DRINKS CONTAINING ALCOHOL DO YOU HAVE ON A TYPICAL DAY?: 3 OR 4

## 2024-06-17 SDOH — ECONOMIC STABILITY: INCOME INSECURITY: HOW HARD IS IT FOR YOU TO PAY FOR THE VERY BASICS LIKE FOOD, HOUSING, MEDICAL CARE, AND HEATING?: NOT VERY HARD

## 2024-06-17 ASSESSMENT — ACTIVITIES OF DAILY LIVING (ADL): LACK_OF_TRANSPORTATION: NO

## 2024-06-17 ASSESSMENT — PAIN - FUNCTIONAL ASSESSMENT
PAIN_FUNCTIONAL_ASSESSMENT: 0-10
PAIN_FUNCTIONAL_ASSESSMENT: 0-10

## 2024-06-17 ASSESSMENT — PAIN SCALES - GENERAL
PAINLEVEL_OUTOF10: 0 - NO PAIN

## 2024-06-17 NOTE — PROGRESS NOTES
"Subjective Data:  No further arrhythmias or SVT overnight  BP better control.  Started on clonidine by primary team as well.  Biggest complaint is irritability and feels like muscle aches and spasms.  Says this happened last time when was on certain blood pressure medications.  Cannot recall if one of them caused it.  Elevated D-dimer and VQ scan ordered for today.       Objective Data:  Last Recorded Vitals:  Vitals:    24 2300 24 0322 24 0545 24 0731   BP: (!) 153/110 (!) 181/147 (!) 168/136 (!) 156/114   BP Location:  Right arm Right arm Right arm   Patient Position: Lying Lying Lying Lying   Pulse:    100   Resp:  17  18   Temp:  36.9 °C (98.4 °F)  36.4 °C (97.5 °F)   TempSrc:  Oral  Oral   SpO2: 99% 98%  97%   Weight:       Height:         Medical Gas Therapy: None (Room air)  Weight  Av kg (205 lb)  Min: 93 kg (205 lb)  Max: 93 kg (205 lb)    LABS:  CMP:  Results from last 7 days   Lab Units 24  0505 24  0113   SODIUM mmol/L 137 139   POTASSIUM mmol/L 3.7 4.1   CHLORIDE mmol/L 102 106   CO2 mmol/L 25 24   ANION GAP mmol/L 14 13   BUN mg/dL 11 15   CREATININE mg/dL 1.24 1.52*   EGFR mL/min/1.73m*2 71 56*   MAGNESIUM mg/dL  --  1.80     CBC:  Results from last 7 days   Lab Units 24  0505 24  0113   WBC AUTO x10*3/uL 7.4 7.6   HEMOGLOBIN g/dL 16.2 15.1   HEMATOCRIT % 47.2 45.8   PLATELETS AUTO x10*3/uL 257 252   MCV fL 88 90     COAG:     ABO: No results found for: \"ABO\"  HEME/ENDO:  Results from last 7 days   Lab Units 24  0113   TSH mIU/L 1.56      CARDIAC:   Results from last 7 days   Lab Units 24  0855 24  0213 24  0113   TROPHS ng/L 89* 54* 38*   BNP pg/mL  --   --  105*      Results from last 7 days   Lab Units 24  0505   LDL CALC mg/dL 174*   VLDL mg/dL 18   CHOLESTEROL/HDL RATIO  4.4        Last I/O:  No intake or output data in the 24 hours ending 24  Net IO Since Admission: 999 mL [24]      Imaging " Results:  ECG 12 lead    Result Date: 6/17/2024  Normal sinus rhythm Possible Left atrial enlargement Nonspecific T wave abnormality Prolonged QT Abnormal ECG When compared with ECG of 16-JUN-2024 01:07, (unconfirmed) Inverted T waves have replaced nonspecific T wave abnormality in Lateral leads    ECG 12 lead    Result Date: 6/17/2024  Normal sinus rhythm Possible Left atrial enlargement Septal infarct , age undetermined Abnormal ECG No previous ECGs available    XR chest 2 views    Result Date: 6/16/2024  Interpreted By:  Capo Diaz, STUDY: XR CHEST 2 VIEWS;  6/16/2024 9:20 am   INDICATION: Signs/Symptoms:chest pain, rhonchi.   COMPARISON: None.   ACCESSION NUMBER(S): [id number]   ORDERING CLINICIAN: RICARDO CEDEÑO   FINDINGS:         CARDIOMEDIASTINAL SILHOUETTE: Cardiomediastinal silhouette is normal in size and configuration.   LUNGS: Lungs are clear.   ABDOMEN: No remarkable upper abdominal findings.   BONES: No acute osseous changes.       1.  No evidence of acute cardiopulmonary process.       MACRO: None   Signed by: Capo Diaz 6/16/2024 10:00 AM Dictation workstation:   [id number]             Inpatient Medications:  Scheduled medications   Medication Dose Route Frequency    cloNIDine  0.2 mg oral q8h ROXANN    dilTIAZem CD  240 mg oral Daily    enoxaparin  40 mg subcutaneous q24h    losartan  100 mg oral Daily    metoprolol  5 mg intravenous Once    metoprolol tartrate  100 mg oral Once    nitroglycerin  0.8 mg sublingual Once    pantoprazole  40 mg oral Daily before breakfast    Or    pantoprazole  40 mg intravenous Daily before breakfast    spironolactone  25 mg oral Daily     PRN medications   Medication    acetaminophen    Or    acetaminophen    Or    acetaminophen    hydrALAZINE    LORazepam    metoprolol    metoprolol    metoprolol    metoprolol    metoprolol tartrate    ondansetron    Or    ondansetron     Continuous Medications   Medication Dose Last Rate       Physical Exam:  GENERAL:  alert, cooperative, pleasant, in no acute distress  SKIN: warm, dry, no rash.  NECK: no JVD, no SAUMYA  CARDIAC: Regular rate and rhythm with no rubs, murmurs, or gallops  CHEST: Normal respiratory efforts, lungs clear to auscultation bilaterally.  ABDOMEN: soft, nontender, nondistended  EXTREMITIES: no edema  NEURO: Alert and oriented x 3.  Grossly normal.  Moves all 4 extremities.       Assessment/Plan   Petr Baptiste is a 48 y.o. male with PMHx current smoker, SVT, uncontrolled HTN (off medications at least 6 months), JOSUE, polysubstance abuse significant for presenting with SVT .  He reported c/o palpations yesterday at work (works at [employer]) when walking up the stairs. Associated with sob, diaphoresis, midsternal chest pain. Symptoms resolved with rest, drinking cold water after about 15 minutes. Symptoms later recurred later during his shift. Symptoms did not resolve after about a half hour, so he decided to call EMS. EMS arrived and found him to be in SVT , given 6 mg Adenosine with conversion to SR.              #parox SVT  -EMS rhythm strip reviewed. SVT , ?AVNRT. S/p 6 mg Adenosine IVP by EMS with conversion to SR.   -Now maintaining SR  -Not on any cardiac meds prior to admit  -Event monitor worn May 2023 at [facility] revealed sinus rhythm without any arrhythmias.  -started on diltiazem here        #HTN, uncontrolled  #Non-adherence to medications, He reported difficulty affording his medications (including several antihypertensives: Losartan 100 mg daily, Metoprolol Tartrate 100 mg BID, Hydralazine 100 mg TID, Imdur 30 mg daily) and c/o cramping with them so he stopped taking them several months ago.      #Elevated Troponin  #Abnormal EKG  HsTrop 38/54/89. EKG on admit NSR, possible LAE.  Q waves in septal leads. No acute ST ischemic changes.   Noted to have c/o cp during SVT episode, now resolved.  Elevated troponin may be 2/2 to type 2 MI demand/ischemia due to SVT, uncontrolled HTN; however unable  to r/o type 1 given significant risk factors for CAD. Nuclear Stress at [facility] 6/2023: negative nuclear images, but noted to have 1 mm ST segment depressions in the precordial and inferior leads after Lexiscan infusion and chest pain was reproduced with lexiscan.  This was accompanied by chest tightness, relieved with Aminophyllin.  #CKD, Scr. 1.5 on admit but now normal     Plan:   Continue diltiazem, losartan, spironolactone.  Holding amlodipine while on diltiazem.  Clonidine started by primary team    Now that BP is a little bit better would like to try for coronary CT angiogram for further ischemic evaluation.      Irritability and jumpiness per primary team.  Unclear whether it might be medication related like clonidine.      Pedro Iraheta, DO

## 2024-06-17 NOTE — CARE PLAN
Problem: Fall/Injury  Goal: Verbalize understanding of risk factor reduction measures to prevent injury from fall in the home  Outcome: Progressing     Problem: Fall/Injury  Goal: Verbalize understanding of personal risk factors for fall in the hospital  Outcome: Progressing   The patient's goals for the shift include      The clinical goals for the shift include patient will remain hds overnight    Over the shift, the patient did not make progress toward the following goals. Barriers to progression include BP management. Recommendations to address these barriers include medication compliance.

## 2024-06-17 NOTE — PROGRESS NOTES
Petr Baptiste is a 48 y.o. male on day 1 of admission presenting with SVT (supraventricular tachycardia) (CMS-[facility]).      Subjective   Seen and examined, reported mild agitation, no new complaints.  No overnight events.  The plan discussed with the patient and RN.       Objective     Last Recorded Vitals  BP (!) 156/114 (BP Location: Right arm, Patient Position: Lying)   Pulse 100   Temp 36.4 °C (97.5 °F) (Oral)   Resp 18   Wt 93 kg (205 lb)   SpO2 97%   Intake/Output last 3 Shifts:  No intake or output data in the 24 hours ending 06/17/24 0933    Admission Weight  Weight: 93 kg (205 lb) (06/16/24 0102)    Daily Weight  06/16/24 : 93 kg (205 lb)    Image Results  ECG 12 lead  Normal sinus rhythm  Possible Left atrial enlargement  Nonspecific T wave abnormality  Prolonged QT  Abnormal ECG  When compared with ECG of 16-JUN-2024 01:07, (unconfirmed)  Inverted T waves have replaced nonspecific T wave abnormality in Lateral leads  ECG 12 lead  Normal sinus rhythm  Possible Left atrial enlargement  Septal infarct , age undetermined  Abnormal ECG  No previous ECGs available      Physical Exam  GENERAL: alert, cooperative, pleasant, in no acute distress  SKIN: warm, dry, no rash.  NECK: no JVD, no SAUMYA  CARDIAC: Regular rate and rhythm with no rubs, murmurs, or gallops  CHEST: Normal respiratory efforts, lungs clear to auscultation bilaterally.  ABDOMEN: soft, nontender, nondistended  EXTREMITIES: no edema  NEURO: Alert and oriented x 3.  Grossly normal.  Moves all 4 extremities.    Relevant Results  Results for orders placed or performed during the hospital encounter of 06/16/24 (from the past 24 hour(s))   Basic metabolic panel   Result Value Ref Range    Glucose 97 74 - 99 mg/dL    Sodium 137 136 - 145 mmol/L    Potassium 3.7 3.5 - 5.3 mmol/L    Chloride 102 98 - 107 mmol/L    Bicarbonate 25 21 - 32 mmol/L    Anion Gap 14 10 - 20 mmol/L    Urea Nitrogen 11 6 - 23 mg/dL    Creatinine 1.24 0.50 - 1.30 mg/dL    eGFR 71  >60 mL/min/1.73m*2    Calcium 9.6 8.6 - 10.3 mg/dL   CBC   Result Value Ref Range    WBC 7.4 4.4 - 11.3 x10*3/uL    nRBC 0.0 0.0 - 0.0 /100 WBCs    RBC 5.35 4.50 - 5.90 x10*6/uL    Hemoglobin 16.2 13.5 - 17.5 g/dL    Hematocrit 47.2 41.0 - 52.0 %    MCV 88 80 - 100 fL    MCH 30.3 26.0 - 34.0 pg    MCHC 34.3 32.0 - 36.0 g/dL    RDW 15.5 (H) 11.5 - 14.5 %    Platelets 257 150 - 450 x10*3/uL   Lipid Panel   Result Value Ref Range    Cholesterol 248 (H) 0 - 199 mg/dL    HDL-Cholesterol 56.7 mg/dL    Cholesterol/HDL Ratio 4.4     LDL Calculated 174 (H) <=99 mg/dL    VLDL 18 0 - 40 mg/dL    Triglycerides 89 0 - 149 mg/dL    Non HDL Cholesterol 191 (H) 0 - 149 mg/dL      The 10-year ASCVD risk score (Mily ROBISON, et al., 2019) is: 20.3%    Values used to calculate the score:      Age: 48 years      Sex: Male      Is Non- : Yes      Diabetic: No      Tobacco smoker: Yes      Systolic Blood Pressure: 156 mmHg      Is BP treated: Yes      HDL Cholesterol: 56.7 mg/dL      Total Cholesterol: 248 mg/dL        Assessment/Plan          Principal Problem:    SVT (supraventricular tachycardia) (CMS-[facility])  Active Problems:    SOB (shortness of breath)    Petr Baptiste is a 48 y.o. male with PMHx current smoker, SVT, uncontrolled HTN (off medications at least 6 months), JOSUE, polysubstance abuse significant for presenting with SVT.     # SVT  -Blood pressure is improving in the 150s this morning  -Labs showed no leukocytosis, hemoglobin 16.2, platelets 257  -Chest x-ray showed no acute findings  -VQ scan pending  -Echo pending  -Cardiology on board    # Uncontrolled hypertension  -Cardiology on board, patient reported to be noncompliant  -Patient supposed to be on on losartan 100 mg daily, metoprolol tartrate 100 mg twice daily, hydralazine 100 mg 3 times daily, Imdur 30 mg daily  -Clonidine started yesterday      # Elevated troponin  -Cardiology on board plan for CT angio today       # Renal  insufficiency  -creat 1.5 on admit. Acute vs CKD, does have uncontrolled HTN as risk factor for progression   -creat 1.31 5/14/23   -avoid nephrotoxic agents           DVT prophylaxis:  Lovenox, SCD       Disposition  -Pending improvement of symptoms, blood pressure control, CT angio with cardiology today.    Bryce Boykin MD

## 2024-06-17 NOTE — PROGRESS NOTES
06/17/24 0743   Discharge Planning   Living Arrangements Alone   Support Systems Parent;Family members   Type of Residence Private residence   Do you have animals or pets at home? No   Who is requesting discharge planning? Patient   Home or Post Acute Services None   Patient expects to be discharged to: home   Does the patient need discharge transport arranged? Yes   RoundTrip coordination needed? Yes   Has discharge transport been arranged? No   Financial Resource Strain   How hard is it for you to pay for the very basics like food, housing, medical care, and heating? Not very   Housing Stability   In the last 12 months, was there a time when you were not able to pay the mortgage or rent on time? N   In the last 12 months, was there a time when you did not have a steady place to sleep or slept in a shelter (including now)? N   Transportation Needs   In the past 12 months, has lack of transportation kept you from medical appointments or from getting medications? no   In the past 12 months, has lack of transportation kept you from meetings, work, or from getting things needed for daily living? No   Patient Choice   Provider Choice list and CMS website (https://medicare.gov/care-compare#search) for post-acute Quality and Resource Measure Data were provided and reviewed with: Patient     I met with this patient at his bedside he is alertx3 at his baseline he is independent with his ADL's lives home alone, he stated he does not use a cane or walker or oxygen, he still drives and will not need transportation home, he stated at this time he does not have a PCP but is working on finding one, he also stated he is working on getting his insurance re-started with his work, I will have SW follow up with patient to see what kind of resources can be offered at this time, patient is anticipating being discharged home when med ready.

## 2024-06-17 NOTE — CARE PLAN
Problem: Fall/Injury  Goal: Not fall by end of shift  Outcome: Progressing  Goal: Be free from injury by end of the shift  Outcome: Progressing  Goal: Verbalize understanding of personal risk factors for fall in the hospital  Outcome: Progressing  Goal: Verbalize understanding of risk factor reduction measures to prevent injury from fall in the home  Outcome: Progressing  Goal: Use assistive devices by end of the shift  Outcome: Progressing  Goal: Pace activities to prevent fatigue by end of the shift  Outcome: Progressing   The patient's goals for the shift include  remaining HDS throughout the shift     The clinical goals for the shift include maintaining pt comfort and safety

## 2024-06-18 ENCOUNTER — PHARMACY VISIT (OUTPATIENT)
Dept: PHARMACY | Facility: CLINIC | Age: 49
End: 2024-06-18
Payer: COMMERCIAL

## 2024-06-18 VITALS
SYSTOLIC BLOOD PRESSURE: 142 MMHG | WEIGHT: 205 LBS | HEIGHT: 69 IN | OXYGEN SATURATION: 97 % | RESPIRATION RATE: 18 BRPM | DIASTOLIC BLOOD PRESSURE: 98 MMHG | TEMPERATURE: 97.4 F | BODY MASS INDEX: 30.36 KG/M2 | HEART RATE: 65 BPM

## 2024-06-18 LAB
ALBUMIN SERPL BCP-MCNC: 4.1 G/DL (ref 3.4–5)
ANION GAP SERPL CALC-SCNC: 14 MMOL/L (ref 10–20)
BASOPHILS # BLD AUTO: 0.03 X10*3/UL (ref 0–0.1)
BASOPHILS NFR BLD AUTO: 0.5 %
BUN SERPL-MCNC: 13 MG/DL (ref 6–23)
CALCIUM SERPL-MCNC: 9.5 MG/DL (ref 8.6–10.3)
CARDIAC TROPONIN I PNL SERPL HS: 31 NG/L (ref 0–20)
CHLORIDE SERPL-SCNC: 103 MMOL/L (ref 98–107)
CO2 SERPL-SCNC: 24 MMOL/L (ref 21–32)
CREAT SERPL-MCNC: 1.29 MG/DL (ref 0.5–1.3)
EGFRCR SERPLBLD CKD-EPI 2021: 68 ML/MIN/1.73M*2
EOSINOPHIL # BLD AUTO: 0.1 X10*3/UL (ref 0–0.7)
EOSINOPHIL NFR BLD AUTO: 1.7 %
ERYTHROCYTE [DISTWIDTH] IN BLOOD BY AUTOMATED COUNT: 15.3 % (ref 11.5–14.5)
GLUCOSE SERPL-MCNC: 100 MG/DL (ref 74–99)
HCT VFR BLD AUTO: 47.7 % (ref 41–52)
HGB BLD-MCNC: 16.1 G/DL (ref 13.5–17.5)
IMM GRANULOCYTES # BLD AUTO: 0.03 X10*3/UL (ref 0–0.7)
IMM GRANULOCYTES NFR BLD AUTO: 0.5 % (ref 0–0.9)
LYMPHOCYTES # BLD AUTO: 1.45 X10*3/UL (ref 1.2–4.8)
LYMPHOCYTES NFR BLD AUTO: 24 %
MAGNESIUM SERPL-MCNC: 2 MG/DL (ref 1.6–2.4)
MCH RBC QN AUTO: 29.9 PG (ref 26–34)
MCHC RBC AUTO-ENTMCNC: 33.8 G/DL (ref 32–36)
MCV RBC AUTO: 89 FL (ref 80–100)
MONOCYTES # BLD AUTO: 0.48 X10*3/UL (ref 0.1–1)
MONOCYTES NFR BLD AUTO: 8 %
NEUTROPHILS # BLD AUTO: 3.94 X10*3/UL (ref 1.2–7.7)
NEUTROPHILS NFR BLD AUTO: 65.3 %
NRBC BLD-RTO: 0 /100 WBCS (ref 0–0)
PHOSPHATE SERPL-MCNC: 4.2 MG/DL (ref 2.5–4.9)
PLATELET # BLD AUTO: 264 X10*3/UL (ref 150–450)
POTASSIUM SERPL-SCNC: 4.1 MMOL/L (ref 3.5–5.3)
RBC # BLD AUTO: 5.39 X10*6/UL (ref 4.5–5.9)
SODIUM SERPL-SCNC: 137 MMOL/L (ref 136–145)
WBC # BLD AUTO: 6 X10*3/UL (ref 4.4–11.3)

## 2024-06-18 PROCEDURE — 2500000001 HC RX 250 WO HCPCS SELF ADMINISTERED DRUGS (ALT 637 FOR MEDICARE OP): Performed by: INTERNAL MEDICINE

## 2024-06-18 PROCEDURE — 2500000002 HC RX 250 W HCPCS SELF ADMINISTERED DRUGS (ALT 637 FOR MEDICARE OP, ALT 636 FOR OP/ED): Performed by: NURSE PRACTITIONER

## 2024-06-18 PROCEDURE — 83735 ASSAY OF MAGNESIUM: CPT | Performed by: INTERNAL MEDICINE

## 2024-06-18 PROCEDURE — 99232 SBSQ HOSP IP/OBS MODERATE 35: CPT | Performed by: NURSE PRACTITIONER

## 2024-06-18 PROCEDURE — 80069 RENAL FUNCTION PANEL: CPT | Performed by: INTERNAL MEDICINE

## 2024-06-18 PROCEDURE — 85025 COMPLETE CBC W/AUTO DIFF WBC: CPT | Performed by: INTERNAL MEDICINE

## 2024-06-18 PROCEDURE — 2500000001 HC RX 250 WO HCPCS SELF ADMINISTERED DRUGS (ALT 637 FOR MEDICARE OP): Performed by: NURSE PRACTITIONER

## 2024-06-18 PROCEDURE — RXMED WILLOW AMBULATORY MEDICATION CHARGE

## 2024-06-18 PROCEDURE — 84484 ASSAY OF TROPONIN QUANT: CPT | Performed by: NURSE PRACTITIONER

## 2024-06-18 PROCEDURE — 2500000004 HC RX 250 GENERAL PHARMACY W/ HCPCS (ALT 636 FOR OP/ED): Performed by: INTERNAL MEDICINE

## 2024-06-18 PROCEDURE — 99239 HOSP IP/OBS DSCHRG MGMT >30: CPT | Performed by: INTERNAL MEDICINE

## 2024-06-18 PROCEDURE — 36415 COLL VENOUS BLD VENIPUNCTURE: CPT | Performed by: INTERNAL MEDICINE

## 2024-06-18 RX ORDER — ATORVASTATIN CALCIUM 40 MG/1
40 TABLET, FILM COATED ORAL NIGHTLY
Status: DISCONTINUED | OUTPATIENT
Start: 2024-06-18 | End: 2024-06-18 | Stop reason: HOSPADM

## 2024-06-18 RX ORDER — LOSARTAN POTASSIUM 100 MG/1
100 TABLET ORAL DAILY
Qty: 30 TABLET | Refills: 11 | Status: SHIPPED | OUTPATIENT
Start: 2024-06-19 | End: 2025-06-19

## 2024-06-18 RX ORDER — CYCLOBENZAPRINE HCL 5 MG
5 TABLET ORAL 3 TIMES DAILY PRN
Qty: 30 TABLET | Refills: 0 | Status: SHIPPED | OUTPATIENT
Start: 2024-06-18 | End: 2024-06-28

## 2024-06-18 RX ORDER — CLONIDINE HYDROCHLORIDE 0.2 MG/1
0.2 TABLET ORAL EVERY 8 HOURS SCHEDULED
Qty: 90 TABLET | Refills: 11 | Status: SHIPPED | OUTPATIENT
Start: 2024-06-18 | End: 2025-06-18

## 2024-06-18 RX ORDER — DILTIAZEM HYDROCHLORIDE 240 MG/1
240 CAPSULE, EXTENDED RELEASE ORAL DAILY
Qty: 30 CAPSULE | Refills: 11 | Status: SHIPPED | OUTPATIENT
Start: 2024-06-19

## 2024-06-18 RX ORDER — ACETAMINOPHEN 325 MG/1
650 TABLET ORAL EVERY 4 HOURS PRN
Qty: 30 TABLET | Refills: 0 | Status: SHIPPED | OUTPATIENT
Start: 2024-06-18 | End: 2024-06-28

## 2024-06-18 RX ORDER — SPIRONOLACTONE 25 MG/1
25 TABLET ORAL DAILY
Qty: 30 TABLET | Refills: 11 | Status: SHIPPED | OUTPATIENT
Start: 2024-06-19 | End: 2025-06-19

## 2024-06-18 RX ORDER — ATORVASTATIN CALCIUM 40 MG/1
40 TABLET, FILM COATED ORAL NIGHTLY
Qty: 30 TABLET | Refills: 11 | Status: SHIPPED | OUTPATIENT
Start: 2024-06-18 | End: 2025-06-18

## 2024-06-18 RX ADMIN — PANTOPRAZOLE SODIUM 40 MG: 40 TABLET, DELAYED RELEASE ORAL at 06:43

## 2024-06-18 RX ADMIN — CLONIDINE HYDROCHLORIDE 0.2 MG: 0.2 TABLET ORAL at 06:43

## 2024-06-18 RX ADMIN — LOSARTAN POTASSIUM 100 MG: 50 TABLET, FILM COATED ORAL at 09:11

## 2024-06-18 RX ADMIN — SPIRONOLACTONE 25 MG: 25 TABLET ORAL at 09:11

## 2024-06-18 RX ADMIN — CYCLOBENZAPRINE HYDROCHLORIDE 5 MG: 5 TABLET, FILM COATED ORAL at 09:11

## 2024-06-18 RX ADMIN — ENOXAPARIN SODIUM 40 MG: 40 INJECTION SUBCUTANEOUS at 06:43

## 2024-06-18 RX ADMIN — DILTIAZEM HYDROCHLORIDE 240 MG: 120 CAPSULE, EXTENDED RELEASE ORAL at 09:11

## 2024-06-18 ASSESSMENT — PAIN SCALES - GENERAL: PAINLEVEL_OUTOF10: 0 - NO PAIN

## 2024-06-18 ASSESSMENT — PAIN - FUNCTIONAL ASSESSMENT: PAIN_FUNCTIONAL_ASSESSMENT: 0-10

## 2024-06-18 NOTE — CARE PLAN
The patient's goals for the shift include  pt will remain safe   The clinical goals for the shift include pt will remain HDS through out the shift    Problem: Fall/Injury  Goal: Not fall by end of shift  Outcome: Progressing  Goal: Be free from injury by end of the shift  Outcome: Progressing  Goal: Verbalize understanding of personal risk factors for fall in the hospital  Outcome: Progressing  Goal: Verbalize understanding of risk factor reduction measures to prevent injury from fall in the home  Outcome: Progressing  Goal: Use assistive devices by end of the shift  Outcome: Progressing  Goal: Pace activities to prevent fatigue by end of the shift  Outcome: Progressing

## 2024-06-18 NOTE — PROGRESS NOTES
"Subjective Data:  Telemetry reviewed. No further arrhythmias noted  BP well controlled  No further  irritability or muscle spasms since  yesterday  Discussed CT findings with patient          Objective Data:  Coronary CTA with moderate disease in RCA   VQ scan negative     Last Recorded Vitals:  Vitals:    24 0046 24 0500 24 0740 24 1104   BP: (!) 134/93 (!) 151/99 (!) 149/105 (!) 142/98   BP Location: Right arm Left arm Right arm Right arm   Patient Position: Lying Lying Sitting Sitting   Pulse: 60  64 65   Resp:    Temp: 36.8 °C (98.3 °F) 36.6 °C (97.8 °F) 36.3 °C (97.4 °F) 36.3 °C (97.4 °F)   TempSrc: Oral Oral Oral Oral   SpO2: 100% 98% 100% 97%   Weight:       Height:         Medical Gas Therapy: None (Room air)  Weight  Av kg (205 lb)  Min: 93 kg (205 lb)  Max: 93 kg (205 lb)    LABS:  CMP:  Results from last 7 days   Lab Units 24  0606 24  0505 24  0113   SODIUM mmol/L 137 137 139   POTASSIUM mmol/L 4.1 3.7 4.1   CHLORIDE mmol/L 103 102 106   CO2 mmol/L 24 25 24   ANION GAP mmol/L 14 14 13   BUN mg/dL 13 11 15   CREATININE mg/dL 1.29 1.24 1.52*   EGFR mL/min/1.73m*2 68 71 56*   MAGNESIUM mg/dL 2.00  --  1.80   ALBUMIN g/dL 4.1  --   --      CBC:  Results from last 7 days   Lab Units 24  0606 24  0505 24  0113   WBC AUTO x10*3/uL 6.0 7.4 7.6   HEMOGLOBIN g/dL 16.1 16.2 15.1   HEMATOCRIT % 47.7 47.2 45.8   PLATELETS AUTO x10*3/uL 264 257 252   MCV fL 89 88 90     COAG:     ABO: No results found for: \"ABO\"  HEME/ENDO:  Results from last 7 days   Lab Units 24  0505 24  0113   TSH mIU/L  --  1.56   HEMOGLOBIN A1C % 5.9*  --       CARDIAC:   Results from last 7 days   Lab Units 24  0855 24  0213 24  0113   TROPHS ng/L 89* 54* 38*   BNP pg/mL  --   --  105*      Results from last 7 days   Lab Units 24  0505   HEMOGLOBIN A1C % 5.9*   LDL CALC mg/dL 174*   VLDL mg/dL 18   CHOLESTEROL/HDL RATIO  4.4        Last " I/O:    Intake/Output Summary (Last 24 hours) at 6/18/2024 1149  Last data filed at 6/18/2024 0900  Gross per 24 hour   Intake 240 ml   Output --   Net 240 ml     Net IO Since Admission: 1,359 mL [06/18/24 1149]      Imaging Results:  ECG 12 lead    Result Date: 6/17/2024  Normal sinus rhythm Possible Left atrial enlargement Nonspecific T wave abnormality Prolonged QT Abnormal ECG When compared with ECG of 16-JUN-2024 01:07, (unconfirmed) Inverted T waves have replaced nonspecific T wave abnormality in Lateral leads    ECG 12 lead    Result Date: 6/17/2024  Normal sinus rhythm Possible Left atrial enlargement Septal infarct , age undetermined Abnormal ECG No previous ECGs available    XR chest 2 views    Result Date: 6/16/2024  Interpreted By:  Capo Diaz, STUDY: XR CHEST 2 VIEWS;  6/16/2024 9:20 am   INDICATION: Signs/Symptoms:chest pain, rhonchi.   COMPARISON: None.   ACCESSION NUMBER(S): [id number]   ORDERING CLINICIAN: RICARDO CEDEÑO   FINDINGS:         CARDIOMEDIASTINAL SILHOUETTE: Cardiomediastinal silhouette is normal in size and configuration.   LUNGS: Lungs are clear.   ABDOMEN: No remarkable upper abdominal findings.   BONES: No acute osseous changes.       1.  No evidence of acute cardiopulmonary process.       MACRO: None   Signed by: Capo Diaz 6/16/2024 10:00 AM Dictation workstation:   [id number]             Inpatient Medications:  Scheduled medications   Medication Dose Route Frequency    atorvastatin  40 mg oral Nightly    cloNIDine  0.2 mg oral q8h ROXANN    cyclobenzaprine  5 mg oral TID    dilTIAZem CD  240 mg oral Daily    enoxaparin  40 mg subcutaneous q24h    losartan  100 mg oral Daily    pantoprazole  40 mg oral Daily before breakfast    Or    pantoprazole  40 mg intravenous Daily before breakfast    spironolactone  25 mg oral Daily     PRN medications   Medication    acetaminophen    Or    acetaminophen    Or    acetaminophen    hydrALAZINE    LORazepam    ondansetron    Or     ondansetron     Continuous Medications   Medication Dose Last Rate       Physical Exam:  GENERAL: alert, cooperative, pleasant, in no acute distress  SKIN: warm, dry, no rash.  NECK: no JVD, no SAUMYA  CARDIAC: Regular rate and rhythm with no rubs, murmurs, or gallops  CHEST: Normal respiratory efforts, lungs clear to auscultation bilaterally.  ABDOMEN: soft, nontender, nondistended  EXTREMITIES: no edema  NEURO: Alert and oriented x 3.  Grossly normal.  Moves all 4 extremities.       Assessment/Plan   Petr Baptiste is a 48 y.o. male with PMHx current smoker, SVT, uncontrolled HTN (off medications at least 6 months), JOSUE, polysubstance abuse significant for presenting with SVT .  He reported c/o palpations yesterday at work (works at [employer]) when walking up the stairs. Associated with sob, diaphoresis, midsternal chest pain. Symptoms resolved with rest, drinking cold water after about 15 minutes. Symptoms later recurred later during his shift. Symptoms did not resolve after about a half hour, so he decided to call EMS. EMS arrived and found him to be in SVT , given 6 mg Adenosine with conversion to SR.      Coronary CTA 6/17/2024  IMPRESSION:  1. The proximal RCA has noncalcified plaque with 50-60% stenosis.  2. The proximal LAD, proximal-mid LCx and LM have focal regions of  calcified plaque stenosis (<25%).  3. Right dominant coronary artery system.  4. Concentric left ventricular hypertrophy.      #Parox SVT  -EMS rhythm strip reviewed. SVT , ?AVNRT. S/p 6 mg Adenosine IVP by EMS with conversion to SR.   -Now maintaining SR  -Not on any cardiac meds prior to admit  -Echocardiogram with normal LV systolic function  -Event monitor worn May 2023 at [facility] revealed sinus rhythm without any arrhythmias.  -started on diltiazem here        #HTN, uncontrolled on admission   #Non-adherence to medications, He reported difficulty affording his medications (including several antihypertensives: Losartan 100 mg daily,  Metoprolol Tartrate 100 mg BID, Hydralazine 100 mg TID, Imdur 30 mg daily) and c/o cramping with them so he stopped taking them several months ago.   -Better controlled now on clonidine 0.2 mg TID, Diltiazem 240 mg ER, Losartan 100 mg daily and spironolactone 25 mg daily      #Elevated Troponin  #Abnormal EKG  HsTrop 38/54/89. EKG on admit NSR, possible LAE.  Q waves in septal leads. No acute ST ischemic changes.   Noted to have c/o cp during SVT episode, now resolved.  Elevated troponin  2/2 to type 2 MI demand/ischemia due to SVT, uncontrolled HTN    #Subclinical coronary artery disease  CT coronary with moderate disease on RCA with 50-60% stenosis.    Recommend atorvastatin 40 mg daily.      Plan:   Continue diltiazem, losartan, spironolactone.  Holding amlodipine while on diltiazem.  Clonidine started by primary team  Start atorvastatin 40 mg daily given coronary CTA findings   Discussed importance of medication compliance with patient. He verbalized understanding.    Follow up with outpatient cardiologist at [facility]      Sonya Branham, APRN-CNP

## 2024-06-18 NOTE — DISCHARGE SUMMARY
Discharge Diagnosis  SVT (supraventricular tachycardia) (CMS-HCC)    Issues Requiring Follow-Up  Cardiology follow-up as outpatient  Referral to healthy at home     Discharge Meds     Your medication list        START taking these medications        Instructions Last Dose Given Next Dose Due   acetaminophen 325 mg tablet  Commonly known as: Tylenol      Take 2 tablets (650 mg) by mouth every 4 hours if needed for mild pain (1 - 3) for up to 10 days.       atorvastatin 40 mg tablet  Commonly known as: Lipitor      Take 1 tablet (40 mg) by mouth once daily at bedtime.       cloNIDine 0.2 mg tablet  Commonly known as: Catapres      Take 1 tablet (0.2 mg) by mouth every 8 hours.       cyclobenzaprine 5 mg tablet  Commonly known as: Flexeril      Take 1 tablet (5 mg) by mouth 3 times a day as needed for muscle spasms (Muscle spasms) for up to 10 days.       dilTIAZem  mg 24 hr capsule  Commonly known as: Tiazac  Start taking on: June 19, 2024      Take 1 capsule (240 mg) by mouth once daily.       losartan 100 mg tablet  Commonly known as: Cozaar  Start taking on: June 19, 2024      Take 1 tablet (100 mg) by mouth once daily.       spironolactone 25 mg tablet  Commonly known as: Aldactone  Start taking on: June 19, 2024      Take 1 tablet (25 mg) by mouth once daily.                 Where to Get Your Medications        These medications were sent to [facility]  [address], [place] [address]      Hours: 8 AM to 6 PM Mon-Fri, 9 AM to 1 PM Saturday Phone: [phone]   acetaminophen 325 mg tablet  atorvastatin 40 mg tablet  cloNIDine 0.2 mg tablet  cyclobenzaprine 5 mg tablet  dilTIAZem  mg 24 hr capsule  losartan 100 mg tablet  spironolactone 25 mg tablet         Test Results Pending At Discharge  Pending Labs       No current pending labs.            Hospital Course     Petr Baptiste is a 48 y.o. male with PMHx current smoker, SVT, uncontrolled HTN (off medications at least 6  months), JOSUE, polysubstance abuse significant for presenting with SVT . He reported c/o palpations at work EMS arrived and found him to be in SVT , given 6 mg Adenosine with conversion to SR. VQ scan showed no PE.  Echo showed Left ventricular systolic function is normal with a 55% estimated ejection fraction.  CT angio showed the proximal RCA has noncalcified plaque with 50-60% stenosis. The proximal LAD, proximal-mid LCx and LM have focal regions of  calcified plaque stenosis (<25%).  The patient was cleared by cardiology for discharge home on the blood pressure regimen that he was on in the hospital, atorvastatin was added.  The patient was agreeable with the plan.  Cardiology referral and healthy at home referral were sent.     Pertinent Physical Exam At Time of Discharge  Physical Exam  GENERAL: alert, cooperative, pleasant, in no acute distress  SKIN: warm, dry, no rash.  NECK: no JVD, no SAUMYA  CARDIAC: Regular rate and rhythm with no rubs, murmurs, or gallops  CHEST: Normal respiratory efforts, lungs clear to auscultation bilaterally.  ABDOMEN: soft, nontender, nondistended  EXTREMITIES: no edema  NEURO: Alert and oriented x 3.  Grossly normal.  Moves all 4 extremities.    Outpatient Follow-Up  No future appointments.      Bryce Boykin MD

## 2024-06-18 NOTE — DISCHARGE INSTRUCTIONS
Dear Mr. Baptiste,    I am writing to thank you for your trust and cooperation during your recent care.    I am also grateful for the opportunity to have worked with you on your health journey. Thank you for allowing me to be a part of your care.    I wish you all the best for your continued health and well-being.    Sincerely,    MD JULISA Galeas ABDUL RAHMAN R on 6/18/24 at 11:35 AM.

## 2024-06-19 LAB
ATRIAL RATE: 81 BPM
ATRIAL RATE: 99 BPM
P AXIS: 70 DEGREES
P AXIS: 75 DEGREES
P OFFSET: 210 MS
P OFFSET: 212 MS
P ONSET: 155 MS
P ONSET: 158 MS
PR INTERVAL: 120 MS
PR INTERVAL: 130 MS
Q ONSET: 218 MS
Q ONSET: 220 MS
QRS COUNT: 13 BEATS
QRS COUNT: 16 BEATS
QRS DURATION: 104 MS
QRS DURATION: 92 MS
QT INTERVAL: 366 MS
QT INTERVAL: 396 MS
QTC CALCULATION(BAZETT): 460 MS
QTC CALCULATION(BAZETT): 469 MS
QTC FREDERICIA: 432 MS
QTC FREDERICIA: 437 MS
R AXIS: 51 DEGREES
R AXIS: 52 DEGREES
T AXIS: 173 DEGREES
T AXIS: 223 DEGREES
T OFFSET: 403 MS
T OFFSET: 416 MS
VENTRICULAR RATE: 81 BPM
VENTRICULAR RATE: 99 BPM

## 2024-06-21 ENCOUNTER — PATIENT OUTREACH (OUTPATIENT)
Dept: HOME HEALTH SERVICES | Age: 49
End: 2024-06-21
Payer: COMMERCIAL

## 2024-07-09 ENCOUNTER — APPOINTMENT (OUTPATIENT)
Dept: CARE COORDINATION | Age: 49
End: 2024-07-09
Payer: COMMERCIAL

## 2024-07-12 PROCEDURE — RXMED WILLOW AMBULATORY MEDICATION CHARGE

## 2024-07-16 ENCOUNTER — PHARMACY VISIT (OUTPATIENT)
Dept: PHARMACY | Facility: CLINIC | Age: 49
End: 2024-07-16
Payer: COMMERCIAL

## 2024-07-17 ENCOUNTER — PATIENT OUTREACH (OUTPATIENT)
Dept: HOME HEALTH SERVICES | Age: 49
End: 2024-07-17
Payer: COMMERCIAL

## 2024-07-17 SDOH — HEALTH STABILITY: PHYSICAL HEALTH: ON AVERAGE, HOW MANY MINUTES DO YOU ENGAGE IN EXERCISE AT THIS LEVEL?: 30 MIN

## 2024-07-17 SDOH — SOCIAL STABILITY: SOCIAL NETWORK: HOW OFTEN DO YOU GET TOGETHER WITH FRIENDS OR RELATIVES?: TWICE A WEEK

## 2024-07-17 SDOH — SOCIAL STABILITY: SOCIAL NETWORK
DO YOU BELONG TO ANY CLUBS OR ORGANIZATIONS SUCH AS CHURCH GROUPS UNIONS, FRATERNAL OR ATHLETIC GROUPS, OR SCHOOL GROUPS?: YES

## 2024-07-17 SDOH — SOCIAL STABILITY: SOCIAL INSECURITY
WITHIN THE LAST YEAR, HAVE TO BEEN RAPED OR FORCED TO HAVE ANY KIND OF SEXUAL ACTIVITY BY YOUR PARTNER OR EX-PARTNER?: NO

## 2024-07-17 SDOH — SOCIAL STABILITY: SOCIAL INSECURITY: WITHIN THE LAST YEAR, HAVE YOU BEEN HUMILIATED OR EMOTIONALLY ABUSED IN OTHER WAYS BY YOUR PARTNER OR EX-PARTNER?: NO

## 2024-07-17 SDOH — HEALTH STABILITY: MENTAL HEALTH: HOW OFTEN DO YOU HAVE A DRINK CONTAINING ALCOHOL?: NEVER

## 2024-07-17 SDOH — ECONOMIC STABILITY: FOOD INSECURITY: WITHIN THE PAST 12 MONTHS, THE FOOD YOU BOUGHT JUST DIDN'T LAST AND YOU DIDN'T HAVE MONEY TO GET MORE.: NEVER TRUE

## 2024-07-17 SDOH — SOCIAL STABILITY: SOCIAL INSECURITY: WITHIN THE LAST YEAR, HAVE YOU BEEN AFRAID OF YOUR PARTNER OR EX-PARTNER?: NO

## 2024-07-17 SDOH — ECONOMIC STABILITY: FOOD INSECURITY: WITHIN THE PAST 12 MONTHS, YOU WORRIED THAT YOUR FOOD WOULD RUN OUT BEFORE YOU GOT MONEY TO BUY MORE.: NEVER TRUE

## 2024-07-17 SDOH — ECONOMIC STABILITY: INCOME INSECURITY: HOW HARD IS IT FOR YOU TO PAY FOR THE VERY BASICS LIKE FOOD, HOUSING, MEDICAL CARE, AND HEATING?: NOT VERY HARD

## 2024-07-17 SDOH — ECONOMIC STABILITY: INCOME INSECURITY: IN THE LAST 12 MONTHS, WAS THERE A TIME WHEN YOU WERE NOT ABLE TO PAY THE MORTGAGE OR RENT ON TIME?: NO

## 2024-07-17 SDOH — ECONOMIC STABILITY: INCOME INSECURITY: IN THE PAST 12 MONTHS, HAS THE ELECTRIC, GAS, OIL, OR WATER COMPANY THREATENED TO SHUT OFF SERVICE IN YOUR HOME?: NO

## 2024-07-17 SDOH — SOCIAL STABILITY: SOCIAL INSECURITY
WITHIN THE LAST YEAR, HAVE YOU BEEN KICKED, HIT, SLAPPED, OR OTHERWISE PHYSICALLY HURT BY YOUR PARTNER OR EX-PARTNER?: NO

## 2024-07-17 SDOH — HEALTH STABILITY: MENTAL HEALTH
STRESS IS WHEN SOMEONE FEELS TENSE, NERVOUS, ANXIOUS, OR CAN'T SLEEP AT NIGHT BECAUSE THEIR MIND IS TROUBLED. HOW STRESSED ARE YOU?: TO SOME EXTENT

## 2024-07-17 SDOH — HEALTH STABILITY: MENTAL HEALTH: HOW OFTEN DO YOU HAVE 6 OR MORE DRINKS ON ONE OCCASION?: NEVER

## 2024-07-17 SDOH — ECONOMIC STABILITY: HOUSING INSECURITY: AT ANY TIME IN THE PAST 12 MONTHS, WERE YOU HOMELESS OR LIVING IN A SHELTER (INCLUDING NOW)?: NO

## 2024-07-17 SDOH — HEALTH STABILITY: PHYSICAL HEALTH: ON AVERAGE, HOW MANY DAYS PER WEEK DO YOU ENGAGE IN MODERATE TO STRENUOUS EXERCISE (LIKE A BRISK WALK)?: 3 DAYS

## 2024-07-17 SDOH — SOCIAL STABILITY: SOCIAL NETWORK

## 2024-07-17 SDOH — SOCIAL STABILITY: SOCIAL NETWORK: HOW OFTEN DO YOU ATTEND CHURCH OR RELIGIOUS SERVICES?: NEVER

## 2024-07-17 SDOH — HEALTH STABILITY: MENTAL HEALTH: HOW MANY STANDARD DRINKS CONTAINING ALCOHOL DO YOU HAVE ON A TYPICAL DAY?: PATIENT DOES NOT DRINK

## 2024-07-17 SDOH — SOCIAL STABILITY: SOCIAL NETWORK: IN A TYPICAL WEEK, HOW MANY TIMES DO YOU TALK ON THE PHONE WITH FAMILY, FRIENDS, OR NEIGHBORS?: TWICE A WEEK

## 2024-07-17 SDOH — SOCIAL STABILITY: SOCIAL NETWORK: HOW OFTEN DO YOU ATTENT MEETINGS OF THE CLUB OR ORGANIZATION YOU BELONG TO?: NEVER

## 2024-07-17 ASSESSMENT — LIFESTYLE VARIABLES
AUDIT-C TOTAL SCORE: 0
SKIP TO QUESTIONS 9-10: 1

## 2024-07-17 NOTE — PROGRESS NOTES
[facility] enrollment call complete.  SDOH reviewed no acute needs identified.  Recent hospital stay  for SVT.  Pt works night shift, would prefer daily calls in a.m.  Initial [facility] call 7/20/24 at 0900.    Pt has no PCP.

## 2024-07-18 ENCOUNTER — PATIENT OUTREACH (OUTPATIENT)
Dept: HOME HEALTH SERVICES | Age: 49
End: 2024-07-18
Payer: COMMERCIAL

## 2024-07-18 NOTE — PROGRESS NOTES
Daily Call Note:   Spoke to patient, he reports feeling alright. Reviewed upcoming PCP and cardio appointments.   Pt can take bp reading upstairs at his sisters, but hasn't today. Encouraged recording bp prior to meds and reporting any new symptoms. He states he is a little stressed out right now, his mom is in hospital. Advised self care.  Next call reviewed.     Pt Education: POC  Barriers: na  Topics for Daily Review: POC  Pt demonstrates clear understanding: Yes    Daily Weight:  There were no vitals filed for this visit.   Last 3 Weights:  Wt Readings from Last 7 Encounters:   06/16/24 93 kg (205 lb)       Masimo Device: No   Masimo Clinical Impression: na    Virtual Visits--Scheduled (Most Recent Date at Top)  Follow up Appointments  Recent Visits  No visits were found meeting these conditions.  Showing recent visits within past 30 days and meeting all other requirements  Future Appointments  No visits were found meeting these conditions.  Showing future appointments within next 90 days and meeting all other requirements       Frequency of RN Calls & Virtual Visits per Team Agreement: Healthy at Home Frequency: Daily    Medication issues Addressed (what was done): na    Follow up appointments scheduled by [facility] Staff: raman  Referrals made by [facility] staff: raman

## 2024-07-20 ENCOUNTER — TELEMEDICINE (OUTPATIENT)
Dept: CARE COORDINATION | Age: 49
End: 2024-07-20
Payer: COMMERCIAL

## 2024-07-20 ENCOUNTER — PATIENT OUTREACH (OUTPATIENT)
Dept: CARE COORDINATION | Age: 49
End: 2024-07-20

## 2024-07-20 DIAGNOSIS — F17.210 CIGARETTE NICOTINE DEPENDENCE WITHOUT COMPLICATION: Primary | ICD-10-CM

## 2024-07-20 RX ORDER — IBUPROFEN 200 MG
1 TABLET ORAL EVERY 24 HOURS
Qty: 42 PATCH | Refills: 0 | Status: SHIPPED | OUTPATIENT
Start: 2024-07-20 | End: 2024-08-31

## 2024-07-20 RX ORDER — IBUPROFEN 200 MG
1 TABLET ORAL EVERY 24 HOURS
Qty: 14 PATCH | Refills: 0 | Status: SHIPPED | OUTPATIENT
Start: 2024-07-20 | End: 2024-08-03

## 2024-07-20 RX ORDER — NICOTINE 7MG/24HR
1 PATCH, TRANSDERMAL 24 HOURS TRANSDERMAL EVERY 24 HOURS
Qty: 14 PATCH | Refills: 0 | Status: SHIPPED | OUTPATIENT
Start: 2024-07-20 | End: 2024-08-03

## 2024-07-20 NOTE — PROGRESS NOTES
"Daily Call Note: Initial [facility] completed today with pt., Susanne Mendoza CNP and this RN. Pt states that he feels \"tired but pretty good today.\" He denies palpitations but does endorse an episode of chest pain \"a couple nights ago.\" This was relieved with aspirin. He denies SOB. All medications reviewed by Susanne with pt. Discussed smoking cessation - pt agreeable to to trying the nicotine patch, for which Susanne sent script to his pharmacy. He was instructed that he cannot smoke while using the patch - he verbalized understanding. Upcoming appts with cardiology and new PCP reviewed - pt verbalized understanding.  Next [facility] scheduled for 7/27 at 0830 - pt verbalized understanding. Confirmed that he has the contact info for the H@ H program.  No other questions or concerns at this time.    Pt Education: per POC  Barriers: none  Topics for Daily Review: med list; fatigue; CP; upcoming appts; smoking cessation  Pt demonstrates clear understanding: Yes    Daily Weight:  There were no vitals filed for this visit.   Last 3 Weights:  Wt Readings from Last 7 Encounters:   06/16/24 93 kg (205 lb)       Masimo Device: No   Masimo Clinical Impression: n/a    Virtual Visits--Scheduled (Most Recent Date at Top)  Follow up Appointments  Recent Visits  No visits were found meeting these conditions.  Showing recent visits within past 30 days and meeting all other requirements  Future Appointments  Date Type Provider Dept   07/29/24 Appointment SWAPNIL Rousseau-CNP Do [id number] [facility]   Showing future appointments within next 90 days and meeting all other requirements       Frequency of RN Calls & Virtual Visits per Team Agreement: Healthy at Home Frequency: Daily    Medication issues Addressed (what was done): reviewed me dlist; CNP added nicotine patch    Follow up appointments scheduled by [facility] Staff: none today  Referrals made by [facility] staff: none        " none

## 2024-07-24 ENCOUNTER — PATIENT OUTREACH (OUTPATIENT)
Dept: HOME HEALTH SERVICES | Age: 49
End: 2024-07-24
Payer: COMMERCIAL

## 2024-07-24 VITALS — HEART RATE: 72 BPM | DIASTOLIC BLOOD PRESSURE: 71 MMHG | SYSTOLIC BLOOD PRESSURE: 131 MMHG

## 2024-07-24 NOTE — PROGRESS NOTES
Daily Call Note:   Spoke to patient, he reports feeling pretty good. Reports bp from yesterday and states when his bp is low/normal like that, he usually gets drowsy but is not experiencing those s/s now. Has been feeling good.  Has been using his sisters bp machine and states is going today to get one.   Wearing nicotine patch, day 2, states doing well, no cravings so far. Advised continuing to wear despite no cravings.   Call freq changed and next call reviewed.     Pt Education: POC  Barriers: na  Topics for Daily Review: POC  Pt demonstrates clear understanding: Yes    Daily Weight:  There were no vitals filed for this visit.   Last 3 Weights:  Wt Readings from Last 7 Encounters:   06/16/24 93 kg (205 lb)       Masimo Device: No   Masimo Clinical Impression: na    Virtual Visits--Scheduled (Most Recent Date at Top)  Follow up Appointments  Recent Visits  No visits were found meeting these conditions.  Showing recent visits within past 30 days and meeting all other requirements  Future Appointments  Date Type Provider Dept   07/29/24 Appointment SWAPNIL Rousseau-CNP Do [id number] [facility]   Showing future appointments within next 90 days and meeting all other requirements       Frequency of RN Calls & Virtual Visits per Team Agreement: Healthy at Home Frequency: Bi-Weekly    Medication issues Addressed (what was done): na    Follow up appointments scheduled by [facility] Staff: raman  Referrals made by [facility] staff: raman

## 2024-07-25 ENCOUNTER — PATIENT OUTREACH (OUTPATIENT)
Dept: HOME HEALTH SERVICES | Age: 49
End: 2024-07-25
Payer: COMMERCIAL

## 2024-07-25 PROBLEM — R55 SYNCOPE: Status: ACTIVE | Noted: 2023-05-10

## 2024-07-25 PROBLEM — M54.50 CHRONIC BILATERAL LOW BACK PAIN WITHOUT SCIATICA: Status: ACTIVE | Noted: 2022-08-11

## 2024-07-25 PROBLEM — E66.811 OBESITY, CLASS I, BMI 30-34.9: Status: ACTIVE | Noted: 2023-05-10

## 2024-07-25 PROBLEM — G89.29 CHRONIC BILATERAL LOW BACK PAIN WITHOUT SCIATICA: Status: ACTIVE | Noted: 2022-08-11

## 2024-07-25 PROBLEM — E66.9 OBESITY, CLASS I, BMI 30-34.9: Status: ACTIVE | Noted: 2023-05-10

## 2024-07-25 RX ORDER — CHLORTHALIDONE 25 MG/1
25 TABLET ORAL
COMMUNITY
Start: 2023-05-16 | End: 2024-07-29 | Stop reason: WASHOUT

## 2024-07-25 RX ORDER — LANOLIN ALCOHOL/MO/W.PET/CERES
100 CREAM (GRAM) TOPICAL 3 TIMES DAILY
COMMUNITY
Start: 2023-05-14 | End: 2024-07-29 | Stop reason: WASHOUT

## 2024-07-25 RX ORDER — AMLODIPINE BESYLATE 10 MG/1
10 TABLET ORAL
COMMUNITY
Start: 2023-05-14 | End: 2024-07-29 | Stop reason: WASHOUT

## 2024-07-25 RX ORDER — METOPROLOL TARTRATE 100 MG/1
100 TABLET ORAL 2 TIMES DAILY
COMMUNITY
Start: 2023-05-14 | End: 2024-07-29 | Stop reason: WASHOUT

## 2024-07-25 RX ORDER — ISOSORBIDE MONONITRATE 30 MG/1
30 TABLET, EXTENDED RELEASE ORAL
COMMUNITY
Start: 2023-06-27 | End: 2024-07-29 | Stop reason: WASHOUT

## 2024-07-25 RX ORDER — HYDRALAZINE HYDROCHLORIDE 100 MG/1
100 TABLET, FILM COATED ORAL 3 TIMES DAILY
COMMUNITY
Start: 2023-05-14 | End: 2024-07-29 | Stop reason: WASHOUT

## 2024-07-25 NOTE — PROGRESS NOTES
Spoke with pt for daily call. I had woken him up from sleep. He has not checked VS or taken any morning meds of of yet, states he usually takes meds and such around 0900. Next call 7/27.      [facility] Transitions Assessment    Patient's Address:   [address] [facility] [address]  [place] [id number]  **  If this is not the address patient will receive services - alert team and address in EMR**       Patient Contacts:  Extended Emergency Contact Information  Primary Emergency Contact: Tika Baptiste  Mobile Phone: [phone]  Relation: Mother                                Patient's Preferred Phone: [phone]  Patient's E-mail: [email or web address]

## 2024-07-27 ENCOUNTER — APPOINTMENT (OUTPATIENT)
Dept: CARE COORDINATION | Age: 49
End: 2024-07-27
Payer: COMMERCIAL

## 2024-07-29 ENCOUNTER — LAB (OUTPATIENT)
Dept: LAB | Facility: LAB | Age: 49
End: 2024-07-29
Payer: COMMERCIAL

## 2024-07-29 ENCOUNTER — APPOINTMENT (OUTPATIENT)
Dept: PRIMARY CARE | Facility: CLINIC | Age: 49
End: 2024-07-29
Payer: COMMERCIAL

## 2024-07-29 VITALS
DIASTOLIC BLOOD PRESSURE: 83 MMHG | BODY MASS INDEX: 30.66 KG/M2 | SYSTOLIC BLOOD PRESSURE: 135 MMHG | HEIGHT: 69 IN | WEIGHT: 207 LBS

## 2024-07-29 DIAGNOSIS — E78.2 MIXED HYPERLIPIDEMIA: ICD-10-CM

## 2024-07-29 DIAGNOSIS — Z12.11 SCREEN FOR COLON CANCER: ICD-10-CM

## 2024-07-29 DIAGNOSIS — I10 HYPERTENSION, UNSPECIFIED TYPE: Primary | ICD-10-CM

## 2024-07-29 DIAGNOSIS — I10 HYPERTENSION, UNSPECIFIED TYPE: ICD-10-CM

## 2024-07-29 LAB
ALBUMIN SERPL BCP-MCNC: 4.5 G/DL (ref 3.4–5)
ALP SERPL-CCNC: 59 U/L (ref 33–120)
ALT SERPL W P-5'-P-CCNC: 16 U/L (ref 10–52)
ANION GAP SERPL CALC-SCNC: 14 MMOL/L (ref 10–20)
APPEARANCE UR: CLEAR
AST SERPL W P-5'-P-CCNC: 14 U/L (ref 9–39)
BILIRUB SERPL-MCNC: 0.3 MG/DL (ref 0–1.2)
BILIRUB UR STRIP.AUTO-MCNC: NEGATIVE MG/DL
BUN SERPL-MCNC: 10 MG/DL (ref 6–23)
CALCIUM SERPL-MCNC: 9.4 MG/DL (ref 8.6–10.6)
CHLORIDE SERPL-SCNC: 107 MMOL/L (ref 98–107)
CHOLEST SERPL-MCNC: 142 MG/DL (ref 0–199)
CHOLESTEROL/HDL RATIO: 2.8
CO2 SERPL-SCNC: 25 MMOL/L (ref 21–32)
COLOR UR: NORMAL
CREAT SERPL-MCNC: 1.29 MG/DL (ref 0.5–1.3)
CREAT UR-MCNC: 114 MG/DL (ref 20–370)
EGFRCR SERPLBLD CKD-EPI 2021: 68 ML/MIN/1.73M*2
ERYTHROCYTE [DISTWIDTH] IN BLOOD BY AUTOMATED COUNT: 15.4 % (ref 11.5–14.5)
GLUCOSE SERPL-MCNC: 115 MG/DL (ref 74–99)
GLUCOSE UR STRIP.AUTO-MCNC: NORMAL MG/DL
HCT VFR BLD AUTO: 44 % (ref 41–52)
HDLC SERPL-MCNC: 50.6 MG/DL
HGB BLD-MCNC: 14.3 G/DL (ref 13.5–17.5)
KETONES UR STRIP.AUTO-MCNC: NEGATIVE MG/DL
LDLC SERPL CALC-MCNC: 81 MG/DL
LEUKOCYTE ESTERASE UR QL STRIP.AUTO: NEGATIVE
MCH RBC QN AUTO: 28.8 PG (ref 26–34)
MCHC RBC AUTO-ENTMCNC: 32.5 G/DL (ref 32–36)
MCV RBC AUTO: 89 FL (ref 80–100)
MICROALBUMIN UR-MCNC: 8.8 MG/L
MICROALBUMIN/CREAT UR: 7.7 UG/MG CREAT
NITRITE UR QL STRIP.AUTO: NEGATIVE
NON HDL CHOLESTEROL: 91 MG/DL (ref 0–149)
NRBC BLD-RTO: 0 /100 WBCS (ref 0–0)
PH UR STRIP.AUTO: 5.5 [PH]
PLATELET # BLD AUTO: 293 X10*3/UL (ref 150–450)
POTASSIUM SERPL-SCNC: 4.9 MMOL/L (ref 3.5–5.3)
PROT SERPL-MCNC: 6.9 G/DL (ref 6.4–8.2)
PROT UR STRIP.AUTO-MCNC: NEGATIVE MG/DL
RBC # BLD AUTO: 4.97 X10*6/UL (ref 4.5–5.9)
RBC # UR STRIP.AUTO: NEGATIVE /UL
SODIUM SERPL-SCNC: 141 MMOL/L (ref 136–145)
SP GR UR STRIP.AUTO: 1.01
TRIGL SERPL-MCNC: 53 MG/DL (ref 0–149)
UROBILINOGEN UR STRIP.AUTO-MCNC: NORMAL MG/DL
VLDL: 11 MG/DL (ref 0–40)
WBC # BLD AUTO: 5.2 X10*3/UL (ref 4.4–11.3)

## 2024-07-29 PROCEDURE — 80053 COMPREHEN METABOLIC PANEL: CPT

## 2024-07-29 PROCEDURE — 3079F DIAST BP 80-89 MM HG: CPT

## 2024-07-29 PROCEDURE — 85027 COMPLETE CBC AUTOMATED: CPT

## 2024-07-29 PROCEDURE — 36415 COLL VENOUS BLD VENIPUNCTURE: CPT

## 2024-07-29 PROCEDURE — 80061 LIPID PANEL: CPT

## 2024-07-29 PROCEDURE — 99204 OFFICE O/P NEW MOD 45 MIN: CPT

## 2024-07-29 PROCEDURE — 82570 ASSAY OF URINE CREATININE: CPT

## 2024-07-29 PROCEDURE — 3008F BODY MASS INDEX DOCD: CPT

## 2024-07-29 PROCEDURE — 81003 URINALYSIS AUTO W/O SCOPE: CPT

## 2024-07-29 PROCEDURE — 82043 UR ALBUMIN QUANTITATIVE: CPT

## 2024-07-29 PROCEDURE — 3075F SYST BP GE 130 - 139MM HG: CPT

## 2024-07-29 ASSESSMENT — PATIENT HEALTH QUESTIONNAIRE - PHQ9
2. FEELING DOWN, DEPRESSED OR HOPELESS: NOT AT ALL
SUM OF ALL RESPONSES TO PHQ9 QUESTIONS 1 AND 2: 0
1. LITTLE INTEREST OR PLEASURE IN DOING THINGS: NOT AT ALL

## 2024-07-29 NOTE — PROGRESS NOTES
"Subjective   Patient ID: Petr Baptiste is a 48 y.o. male who presents for Clinic new .  HPI  48 year old male presents today to establish care. PMH of DM2, HLD, HTN, SVT.   States that he has been on light duty at [employer], needs clearance to go back to full duty.     HTN: diltiazem 240mg, losartan 100mg, spironolactone 25mg,  clonodine 0.2mg TID  HLD: atorvastatin 40mg     Currently using nicoderm patches 21mg daily x6 weeks --> 14mg daily x2 weeks --> 7mg daily x2 weeks.   Doing well on patches and OFF cigarettes. Congratulations provided!     All systems have been reviewed and are negative for complaint other than those mentioned in the HPI.     /83 (BP Location: Left arm, Patient Position: Sitting, BP Cuff Size: Large adult)   Ht 1.753 m (5' 9\")   Wt 93.9 kg (207 lb)   BMI 30.57 kg/m²    Objective   Physical Exam  Constitutional:       General: He is awake.      Appearance: Normal appearance.   HENT:      Head: Normocephalic and atraumatic.   Eyes:      Extraocular Movements: Extraocular movements intact.      Pupils: Pupils are equal, round, and reactive to light.   Cardiovascular:      Rate and Rhythm: Normal rate and regular rhythm.      Heart sounds: S1 normal and S2 normal. No murmur heard.  Pulmonary:      Effort: Pulmonary effort is normal.      Breath sounds: Normal breath sounds.   Musculoskeletal:      Cervical back: Normal range of motion and neck supple.      Right lower leg: No edema.      Left lower leg: No edema.   Skin:     General: Skin is warm and dry.   Neurological:      General: No focal deficit present.      Mental Status: He is alert and oriented to person, place, and time.   Psychiatric:         Mood and Affect: Mood and affect normal.         Behavior: Behavior normal. Behavior is cooperative.         Thought Content: Thought content normal.         Judgment: Judgment normal.     Petr was seen today for clinic new .  Diagnoses and all orders for this visit:  Hypertension, " unspecified type (Primary)  -     Stable, continue current medication   - Recheck labs to eval Cr and K on new medications   -     Comprehensive Metabolic Panel; Future  -     CBC; Future  -     Albumin-Creatinine Ratio, Urine Random; Future  -     Urinalysis with Reflex Microscopic; Future  Screen for colon cancer  -     Cologuard® colon cancer screening; Future  -     Cologuard® colon cancer screening  Mixed hyperlipidemia  -     Has been on statin x6 weeks, recheck lipid panel  - Lipid panel; Future    Patient has appointment scheduled with cardiology. Patient should discuss work clearance with them.   Follow up in 3 months.

## 2024-07-30 ENCOUNTER — PATIENT OUTREACH (OUTPATIENT)
Dept: HOME HEALTH SERVICES | Age: 49
End: 2024-07-30
Payer: COMMERCIAL

## 2024-08-01 ENCOUNTER — PATIENT OUTREACH (OUTPATIENT)
Dept: HOME HEALTH SERVICES | Age: 49
End: 2024-08-01
Payer: COMMERCIAL

## 2024-08-03 ENCOUNTER — PATIENT OUTREACH (OUTPATIENT)
Dept: HOME HEALTH SERVICES | Age: 49
End: 2024-08-03
Payer: COMMERCIAL

## 2024-08-03 VITALS — SYSTOLIC BLOOD PRESSURE: 133 MMHG | DIASTOLIC BLOOD PRESSURE: 83 MMHG | HEART RATE: 64 BPM

## 2024-08-03 NOTE — PROGRESS NOTES
Daily Call Note: Daily call completed - The patient stated he is doing well with smoking cessation. He continues to wear the nicotine patch and has had no desire to smoke. He and mother are supporting each other with the effort - he feels no need for group support or material at this time.    Pt Education: Educated on the most effective way to measure blood pressure. The patient took his blood pressure this morning as soon as he got home from work at 0815 which read 191/119 - no meds - he took all of his meds at 0900 and rechecked his BP at 0935 which was 167/102. He was going to bed, as he works night shift - GAYLE Mendoza was notified regarding the BP readings and asked for reassessment after the patient awoke this afternoon. The patient agreed to call back with the updated reading.    Pt demonstrates clear understanding: Yes    8/3/24 at 1540 patient called reading /83 - CNP notified of updated reading. Confirmed [facility] appointment for tomorrow am with the patient.    /83   Pulse 64     Daily Weight:  There were no vitals filed for this visit.   Last 3 Weights:  Wt Readings from Last 7 Encounters:   07/29/24 93.9 kg (207 lb)   06/16/24 93 kg (205 lb)           Virtual Visits--Scheduled (Most Recent Date at Top)  Follow up Appointments  Recent Visits  Date Type Provider Dept   07/29/24 Office Visit TAM Rousseau Do [id number] [facility]   Showing recent visits within past 30 days and meeting all other requirements  Future Appointments  Date Type Provider Dept   10/28/24 Appointment TAM Rousseau Do [id number] [facility]   Showing future appointments within next 90 days and meeting all other requirements       Frequency of RN Calls & Virtual Visits per Team Agreement: Healthy at Home Frequency: T/Th/Sat

## 2024-08-04 ENCOUNTER — TELEMEDICINE (OUTPATIENT)
Dept: CARE COORDINATION | Age: 49
End: 2024-08-04
Payer: COMMERCIAL

## 2024-08-04 ENCOUNTER — PATIENT OUTREACH (OUTPATIENT)
Dept: HOME HEALTH SERVICES | Age: 49
End: 2024-08-04

## 2024-08-04 VITALS
SYSTOLIC BLOOD PRESSURE: 114 MMHG | WEIGHT: 207 LBS | HEART RATE: 78 BPM | DIASTOLIC BLOOD PRESSURE: 75 MMHG | BODY MASS INDEX: 30.57 KG/M2

## 2024-08-04 DIAGNOSIS — I47.10 SVT (SUPRAVENTRICULAR TACHYCARDIA) (CMS-HCC): Primary | ICD-10-CM

## 2024-08-04 DIAGNOSIS — F17.210 CIGARETTE NICOTINE DEPENDENCE WITHOUT COMPLICATION: ICD-10-CM

## 2024-08-04 DIAGNOSIS — I10 PRIMARY HYPERTENSION: ICD-10-CM

## 2024-08-04 NOTE — PROGRESS NOTES
Pt states he feeling alright, just tired. No CP or heart palp since last call. Pt does c/o some heart burn a few days ago after eating. BP at work last night 2230 153/109 and 0300 188/129, pt does say he was stressed at work. BP this morning 114/75, HR 78 (after meds). Wt 207. Pt has been wearing nicotine patch and has not smoked. Cholesterol has improved on labs. Pt denied needing any medication refills. Explained to wear 21 mg for 6 weeks, then move down to 14 mg then 7 mg.           Patient's Address:   [facility] [address] [place] [address]  **  If this is not the address patient will receive services - alert team and address in EMR**       Patient Contacts:  Extended Emergency Contact Information  Primary Emergency Contact: Tika Baptiste  Mobile Phone: [phone]  Relation: Mother                                Patient's Preferred Phone: [phone]  Patient's E-mail: [email or web address]

## 2024-08-06 ENCOUNTER — PATIENT OUTREACH (OUTPATIENT)
Dept: CARE COORDINATION | Age: 49
End: 2024-08-06
Payer: COMMERCIAL

## 2024-08-06 VITALS — HEART RATE: 67 BPM | DIASTOLIC BLOOD PRESSURE: 65 MMHG | SYSTOLIC BLOOD PRESSURE: 108 MMHG

## 2024-08-06 NOTE — PROGRESS NOTES
"Daily Call Note: Daily call completed with pt today. He denies CP, SOB and edema, though admits to feeling \"really tired.\" Worked only a few hours last night and was with someone who \"doesn't make my BP go up.\" Much support an encouragement provided. Pt is still not smoking - again provided encouragement. Wearing the nicotine patch with good effect.   Next [facility] scheduled for 8/11 at 0830.  No other questions or concerns at this time.    Pt Education: POC  Barriers: none  Topics for Daily Review: BP, fatigue, smoking  Pt demonstrates clear understanding: Yes    Daily Weight:  There were no vitals filed for this visit.   Last 3 Weights:  Wt Readings from Last 7 Encounters:   08/04/24 93.9 kg (207 lb)   07/29/24 93.9 kg (207 lb)   06/16/24 93 kg (205 lb)       Masimo Device: No   Masimo Clinical Impression: n/a    Virtual Visits--Scheduled (Most Recent Date at Top)  Follow up Appointments  Recent Visits  Date Type Provider Dept   08/04/24 Telemedicine TAM [facility]   07/29/24 Office Visit TAM Rousseau Do [id number] [facility]   Showing recent visits within past 30 days and meeting all other requirements  Future Appointments  Date Type Provider Dept   10/28/24 Appointment TAM Rousseau Do [id number] [facility]   Showing future appointments within next 90 days and meeting all other requirements       Frequency of RN Calls & Virtual Visits per Team Agreement: Healthy at Home Frequency: T/TH/SAT    Medication issues Addressed (what was done): nicotine patch  Follow up appointments scheduled by [facility] Staff: none  Referrals made by [facility] staff: none        "

## 2024-08-08 ENCOUNTER — PATIENT OUTREACH (OUTPATIENT)
Dept: HOME HEALTH SERVICES | Age: 49
End: 2024-08-08
Payer: COMMERCIAL

## 2024-08-08 VITALS — HEART RATE: 64 BPM | DIASTOLIC BLOOD PRESSURE: 105 MMHG | SYSTOLIC BLOOD PRESSURE: 157 MMHG

## 2024-08-08 NOTE — PROGRESS NOTES
Daily Call Note:   Spoke to patient, states feeling alright. Reports bp, slightly elevated but reading prior to meds. He will take 0900 meds and recheck later and call in if needed.  He reports he is checking his bp when he is at work, so on Saturdays call, he will have more readings for us.   Denies any new symptoms. Denies headache or changes in vision.   No further questions, next call reviewed.   Pt Education: POC  Barriers: na  Topics for Daily Review: POC  Pt demonstrates clear understanding: Yes    Daily Weight:  There were no vitals filed for this visit.   Last 3 Weights:  Wt Readings from Last 7 Encounters:   08/04/24 93.9 kg (207 lb)   07/29/24 93.9 kg (207 lb)   06/16/24 93 kg (205 lb)       Masimo Device: No   Masimo Clinical Impression: na    Virtual Visits--Scheduled (Most Recent Date at Top)  Follow up Appointments  Recent Visits  Date Type Provider Dept   07/29/24 Office Visit TAM Rousseau Do [id number] [facility]   Showing recent visits within past 30 days and meeting all other requirements  Future Appointments  Date Type Provider Dept   10/28/24 Appointment TAM Rousseau Do [id number] [facility]   Showing future appointments within next 90 days and meeting all other requirements       Frequency of RN Calls & Virtual Visits per Team Agreement: Healthy at Home Frequency: Bi-Weekly    Medication issues Addressed (what was done): na    Follow up appointments scheduled by [facility] Staff: raman  Referrals made by [facility] staff: raman

## 2024-08-10 ENCOUNTER — PATIENT OUTREACH (OUTPATIENT)
Dept: HOME HEALTH SERVICES | Age: 49
End: 2024-08-10
Payer: COMMERCIAL

## 2024-08-10 VITALS — DIASTOLIC BLOOD PRESSURE: 89 MMHG | HEART RATE: 71 BPM | SYSTOLIC BLOOD PRESSURE: 124 MMHG

## 2024-08-10 NOTE — PROGRESS NOTES
Daily Call Note: Call complete. Patient is doing good. He denies CP and SOB. He states he still is not smoking, he is wearing the nicotine patch. Next [facility] rescheduled for 8/12/24 at 0830, verbalized understanding. No other questions at this time.    /89   Pulse 71       Pt Education: per POC  Barriers: none  Topics for Daily Review: BP  Pt demonstrates clear understanding: Yes    Daily Weight:  There were no vitals filed for this visit.   Last 3 Weights:  Wt Readings from Last 7 Encounters:   08/04/24 93.9 kg (207 lb)   07/29/24 93.9 kg (207 lb)   06/16/24 93 kg (205 lb)       Masimo Device: No   Masimo Clinical Impression: n/a    Virtual Visits--Scheduled (Most Recent Date at Top)  Follow up Appointments  Recent Visits  Date Type Provider Dept   07/29/24 Office Visit TAM Rousseau Do [id number] [facility]   Showing recent visits within past 30 days and meeting all other requirements  Future Appointments  Date Type Provider Dept   10/28/24 Appointment TAM Rousseau Do [id number] [facility]   Showing future appointments within next 90 days and meeting all other requirements       Frequency of RN Calls & Virtual Visits per Team Agreement: Healthy at Home Frequency: TU/TH/SAT    Medication issues Addressed (what was done): none    Follow up appointments scheduled by [facility] Staff: none  Referrals made by [facility] staff: none

## 2024-08-11 ENCOUNTER — APPOINTMENT (OUTPATIENT)
Dept: CARE COORDINATION | Age: 49
End: 2024-08-11
Payer: COMMERCIAL

## 2024-08-12 ENCOUNTER — PATIENT OUTREACH (OUTPATIENT)
Dept: CARE COORDINATION | Age: 49
End: 2024-08-12

## 2024-08-12 ENCOUNTER — APPOINTMENT (OUTPATIENT)
Dept: CARE COORDINATION | Age: 49
End: 2024-08-12
Payer: COMMERCIAL

## 2024-08-12 VITALS — HEART RATE: 68 BPM | DIASTOLIC BLOOD PRESSURE: 82 MMHG | SYSTOLIC BLOOD PRESSURE: 110 MMHG

## 2024-08-12 LAB — NONINV COLON CA DNA+OCC BLD SCRN STL QL: NEGATIVE

## 2024-08-12 NOTE — PROGRESS NOTES
Daily Call Note: 0830 - [facility] call completed with pt, Dr. Gonzalez, Guilherme Blue PharmD and this RN. Pt states that he is doing well. He is monitoring his BP and keeping a log. Blood pressure continues to fluctuate, elevating especially when he is at work, particularly when in specific departments. He reports that one particular department wants him to return, but he wants to see the cardiologist first.  The clonidine does cause him to be sleepy at work - Dr. Gonzalez discussed changing the times of administration. Pt verbalized understanding and will give that a try. Dr. Gonzalez also reviewed the rest of the pt's medications. Pt has good understanding re: same.  Pt continues to do well with smoking cessation - still no cigarettes since July 19; using the nicotine patch with good effect. Dr. Gonzalez explained that the nicotine may also be part of the reason that his blood pressure fluctuates and once pt has completed the nicotine patch and the nicotine leaves his system, BP's should normalize. Pt verbalized understanding.   Reviewed upcoming appts - pt aware of them.  Next [facility] scheduled for 8/18 at 0900.  No other questions or concerns at this time.    Pt Education: per POC  Barriers: none  Topics for Daily Review: BP; smoking cessation; meds  Pt demonstrates clear understanding: Yes    Daily Weight:  There were no vitals filed for this visit.   Last 3 Weights:  Wt Readings from Last 7 Encounters:   08/04/24 93.9 kg (207 lb)   07/29/24 93.9 kg (207 lb)   06/16/24 93 kg (205 lb)       Masimo Device: No   Masimo Clinical Impression: n/a    Virtual Visits--Scheduled (Most Recent Date at Top)  Follow up Appointments  Recent Visits  Date Type Provider Dept   07/29/24 Office Visit TAM Rousseau Do [id number] [facility]   Showing recent visits within past 30 days and meeting all other requirements  Future Appointments  Date Type Provider Dept   10/28/24 Appointment TAM Rousseau Do [id number] [facility]    Showing future appointments within next 90 days and meeting all other requirements       Frequency of RN Calls & Virtual Visits per Team Agreement: Healthy at Home Frequency: T/TH/SA    Medication issues Addressed (what was done): clonidine causing fatigue at work; will try changing the time of administration    Follow up appointments scheduled by [facility] Staff: none  Referrals made by [facility] staff: [email or web address]

## 2024-08-18 ENCOUNTER — PATIENT OUTREACH (OUTPATIENT)
Dept: HOME HEALTH SERVICES | Age: 49
End: 2024-08-18

## 2024-08-18 ENCOUNTER — TELEMEDICINE CLINICAL SUPPORT (OUTPATIENT)
Dept: CARE COORDINATION | Age: 49
End: 2024-08-18
Payer: COMMERCIAL

## 2024-08-18 DIAGNOSIS — I47.10 SVT (SUPRAVENTRICULAR TACHYCARDIA) (CMS-HCC): Primary | ICD-10-CM

## 2024-08-18 DIAGNOSIS — I10 PRIMARY HYPERTENSION: ICD-10-CM

## 2024-08-18 NOTE — PROGRESS NOTES
Daily Call Note: [facility] weekly call with the patient and Dr. Nielson.  MD reviewed the patient's clonidine schedule with him to confirm that it was still working for him. He instructed him on the optimal range for maintenance of BP, of which the patient was unsure. He told him the systolic should be 120 to 130, and to keep the diastolic in the mid 80s. The patient verbalized understanding. The patient was instructed on managing a low sodium diet, with which he has been struggling. He was given tips on finding and logging the sodium content of foods until he became familiar with those numbers. He was also advised to continue monitoring his blood pressure before and after medications - to be aware of potential hypotension as adverse effects, and also monitoring for potential increases - to make it a life long habit, and to present to his providers the log.  The patient and provider agree that he is ready for graduation from the program - all follow up appointments are in place - patient is aware that refills are available from his preferred pharmacy for all meds except the nicotine patches, which he has completed. He stated that he has completely quit smoking an alcohol intake as well.    Pt demonstrates clear understanding: Yes    Daily Weight:  There were no vitals filed for this visit.   Last 3 Weights:  Wt Readings from Last 7 Encounters:   08/04/24 93.9 kg (207 lb)   07/29/24 93.9 kg (207 lb)   06/16/24 93 kg (205 lb)         Virtual Visits--Scheduled (Most Recent Date at Top)  Follow up Appointments  Recent Visits  Date Type Provider Dept   07/29/24 Office Visit TAM Rousseau Do [id number]   Showing recent visits within past 30 days and meeting all other requirements  Future Appointments  Date Type Provider Dept   10/28/24 Appointment TAM Rousseau [id number]   Showing future appointments within next 90 days and meeting all other requirements       Frequency of RN Calls &  Virtual Visits per Team Agreement: Healthy at Home Frequency: tri-weekly

## 2024-09-04 ENCOUNTER — OFFICE VISIT (OUTPATIENT)
Dept: CARDIOLOGY | Facility: HOSPITAL | Age: 49
End: 2024-09-04
Payer: COMMERCIAL

## 2024-09-04 VITALS
BODY MASS INDEX: 30.62 KG/M2 | HEIGHT: 68 IN | SYSTOLIC BLOOD PRESSURE: 123 MMHG | OXYGEN SATURATION: 98 % | HEART RATE: 54 BPM | DIASTOLIC BLOOD PRESSURE: 85 MMHG | WEIGHT: 202 LBS

## 2024-09-04 DIAGNOSIS — I47.10 SVT (SUPRAVENTRICULAR TACHYCARDIA) (CMS-HCC): Chronic | ICD-10-CM

## 2024-09-04 DIAGNOSIS — I25.10 CORONARY ARTERY DISEASE INVOLVING NATIVE CORONARY ARTERY OF NATIVE HEART WITHOUT ANGINA PECTORIS: Chronic | ICD-10-CM

## 2024-09-04 DIAGNOSIS — I10 PRIMARY HYPERTENSION: Primary | Chronic | ICD-10-CM

## 2024-09-04 PROCEDURE — 3008F BODY MASS INDEX DOCD: CPT | Performed by: INTERNAL MEDICINE

## 2024-09-04 PROCEDURE — 93005 ELECTROCARDIOGRAM TRACING: CPT | Performed by: INTERNAL MEDICINE

## 2024-09-04 PROCEDURE — 93010 ELECTROCARDIOGRAM REPORT: CPT | Performed by: INTERNAL MEDICINE

## 2024-09-04 PROCEDURE — 3079F DIAST BP 80-89 MM HG: CPT | Performed by: INTERNAL MEDICINE

## 2024-09-04 PROCEDURE — 99214 OFFICE O/P EST MOD 30 MIN: CPT | Mod: 25 | Performed by: INTERNAL MEDICINE

## 2024-09-04 PROCEDURE — 1036F TOBACCO NON-USER: CPT | Performed by: INTERNAL MEDICINE

## 2024-09-04 PROCEDURE — 3074F SYST BP LT 130 MM HG: CPT | Performed by: INTERNAL MEDICINE

## 2024-09-04 PROCEDURE — 99214 OFFICE O/P EST MOD 30 MIN: CPT | Performed by: INTERNAL MEDICINE

## 2024-09-04 ASSESSMENT — ENCOUNTER SYMPTOMS
DEPRESSION: 0
LOSS OF SENSATION IN FEET: 0
OCCASIONAL FEELINGS OF UNSTEADINESS: 0

## 2024-09-04 ASSESSMENT — PAIN SCALES - GENERAL: PAINLEVEL: 0-NO PAIN

## 2024-09-04 NOTE — PROGRESS NOTES
"Chief Complaint:   New Patient Visit and Hospital Follow-up (Pt denies CP, palpitations, and SOB)     History Of Present Illness:    Petr Baptiste is a 48 y.o. male here regarding . He has a history of JOSUE, tobacco abuse, hypertension, & pre-diabetes. He was hospitalized 6/2024 with an episode of SVT in the setting of uncontrolled hypertension while not taking his blood pressure medications reportedly due to cost. He also had some chest discomfort consistent with a type II MI. Coronary CT angiography revealed mild-moderate non-obstructive CAD (notable mostly for a 50-60% proximal RCA lesion). His TTE was unremarkable outside of moderate LVH.       He reports feeling well. Denies further palpitations or chest discomfort. Denies cardiovascular symptoms or symptoms in general. Has stopped smoking.      Last Recorded Vitals:  Vitals:    09/04/24 1409   BP: 123/85   BP Location: Right arm   Patient Position: Sitting   Pulse: 54   SpO2: 98%   Weight: 91.6 kg (202 lb)   Height: 1.727 m (5' 8\")             Allergies:  Penicillins    Outpatient Medications:  Current Outpatient Medications   Medication Instructions    atorvastatin (LIPITOR) 40 mg, oral, Nightly    cloNIDine (CATAPRES) 0.2 mg, oral, Every 8 hours scheduled    dilTIAZem XR (DILACOR XR) 240 mg, oral, Daily, Do not start before June 19, 2024.    losartan (COZAAR) 100 mg, oral, Daily    nicotine (Nicoderm CQ) 14 mg/24 hr patch 1 patch, transdermal, Every 24 hours, Start after 6 weeks of 21 mg patch    nicotine (Nicoderm CQ) 21 mg/24 hr patch 1 patch, transdermal, Every 24 hours    nicotine (Nicoderm CQ) 7 mg/24 hr patch 1 patch, transdermal, Every 24 hours, Start after 2 weeks of 14 mg patch    spironolactone (ALDACTONE) 25 mg, oral, Daily         Physical Exam:  Gen Well appearing adult male sitting up in NAD. Body mass index is 30.71 kg/m².   CV rrr. No m/r/g appreciated. No JVD or leg edema.   Pulm Lungs clear with normal respiratory effort.  Neuro Alert and " conversant. Grossly nonfocal.       I reviewed the patient's ECG - SB. NS T wave abnormality.    I reviewed most recent imaging / labs / and office notes as well as details of any recent hospitalizations or ED visits.    Assessment/Plan   1.  Hypertension   BP well controlled on combination therapies. His present regimen is to continue.     2. SVT  Paroxysmal. On AV blockade which also serves to aide in managing #1. Ablation referral if recurs.    3. Coronary artery disease   Non-obstructive. On statin indefinitely.    4. Tobacco abuse   History of. He is an ex-smoker as of late. Continued tobacco avoidance encouraged.        Follow-up 12 months        Ronaldo Martines MD         ADDENDUM:  ECG re-reviewed and actually appears consistent with pre-excitation and confirmed this with EP. Given his episodic SVT, ablation would likely be in his best interest. Spoke to the patient about this diagnosis and he has been referred to see EP for an ablation consideration.

## 2024-09-05 LAB
ATRIAL RATE: 54 BPM
P AXIS: 72 DEGREES
P OFFSET: 205 MS
P ONSET: 150 MS
PR INTERVAL: 126 MS
Q ONSET: 213 MS
QRS COUNT: 9 BEATS
QRS DURATION: 120 MS
QT INTERVAL: 454 MS
QTC CALCULATION(BAZETT): 430 MS
QTC FREDERICIA: 438 MS
R AXIS: 50 DEGREES
T AXIS: 47 DEGREES
T OFFSET: 440 MS
VENTRICULAR RATE: 54 BPM

## 2024-09-13 DIAGNOSIS — I47.10 SVT (SUPRAVENTRICULAR TACHYCARDIA) (CMS-HCC): Primary | ICD-10-CM

## 2024-09-13 DIAGNOSIS — I45.6 WPW (WOLFF-PARKINSON-WHITE SYNDROME): ICD-10-CM

## 2024-10-09 NOTE — PROGRESS NOTES
I had the pleasure seeing Petr Baptiste     Chief Complaint   Patient presents with    SVT (Supraventricular Tachycardia)    Hypertension     OUTPATIENT CONSULTATION: Cardiac Electrophysiology  DOS: October 11, 2024  REASON:  SVT - ablation consult   REFERRING:  Ronaldo Martines MD          Current Outpatient Medications   Medication Instructions    atorvastatin (LIPITOR) 40 mg, oral, Nightly    cloNIDine (CATAPRES) 0.2 mg, oral, Every 8 hours scheduled    dilTIAZem XR (DILACOR XR) 240 mg, oral, Daily, Do not start before June 19, 2024.    losartan (COZAAR) 100 mg, oral, Daily    nicotine (Nicoderm CQ) 14 mg/24 hr patch 1 patch, transdermal, Every 24 hours, Start after 6 weeks of 21 mg patch    nicotine (Nicoderm CQ) 21 mg/24 hr patch 1 patch, transdermal, Every 24 hours    nicotine (Nicoderm CQ) 7 mg/24 hr patch 1 patch, transdermal, Every 24 hours, Start after 2 weeks of 14 mg patch    spironolactone (ALDACTONE) 25 mg, oral, Daily      Petr Baptiste is a 48 y.o. with:     Pmh includes HTN, Obesity, JOSUE, and Shortness of Breath.      Cardiac Hx:    Syncope  Non-obstructive CAD - noted on CT coronary angio (notable mostly for a 50-60% proximal RCA lesion)   WPW  SVT - (index dx 6/2024)     His symptoms include pounding in chest and shortness of breath.      Objective   Physical Exam  Constitutional: alert and in no acute distress.   Eyes: no erythema, swelling or discharge from the eye .   Neck: neck is supple, symmetric, trachea midline, no masses .   Pulmonary: no increased work of breathing or signs of respiratory distress  and lungs clear to auscultation.    Cardiovascular:  regular rhythm, normal S1 and S2, no murmurs , pedal pulses 2+ bilaterally  and no edema .   Abdomen: abdomen non-tender, no masses .   Skin: skin warm and dry, normal skin turgor .   Psychiatric judgment and insight is normal  and oriented to person, place and time .             Assessment/Plan   SVT/WPW - He had episodes in the past in  [place] in 2017 where adenosine had to be given. He also had recent episode with heart rate in the 200's again terminated with adenosine. ECG is consistent with pre-excitation.  Discussed the condition with him and discussed the options with him - he is also a  - I did explain to him the EPS and the ablation. We are proceeding with it on November 5th, 2024.

## 2024-10-11 ENCOUNTER — OFFICE VISIT (OUTPATIENT)
Dept: CARDIOLOGY | Facility: HOSPITAL | Age: 49
End: 2024-10-11
Payer: COMMERCIAL

## 2024-10-11 VITALS
OXYGEN SATURATION: 96 % | WEIGHT: 197 LBS | RESPIRATION RATE: 17 BRPM | HEIGHT: 69 IN | DIASTOLIC BLOOD PRESSURE: 79 MMHG | BODY MASS INDEX: 29.18 KG/M2 | HEART RATE: 57 BPM | SYSTOLIC BLOOD PRESSURE: 117 MMHG

## 2024-10-11 DIAGNOSIS — Z01.818 PREOP TESTING: ICD-10-CM

## 2024-10-11 DIAGNOSIS — I45.6 WPW (WOLFF-PARKINSON-WHITE SYNDROME): ICD-10-CM

## 2024-10-11 DIAGNOSIS — I47.10 SVT (SUPRAVENTRICULAR TACHYCARDIA) (CMS-HCC): ICD-10-CM

## 2024-10-11 LAB
ATRIAL RATE: 57 BPM
P AXIS: 71 DEGREES
P OFFSET: 215 MS
P ONSET: 152 MS
PR INTERVAL: 130 MS
Q ONSET: 217 MS
QRS COUNT: 10 BEATS
QRS DURATION: 104 MS
QT INTERVAL: 422 MS
QTC CALCULATION(BAZETT): 410 MS
QTC FREDERICIA: 414 MS
R AXIS: 48 DEGREES
T AXIS: 52 DEGREES
T OFFSET: 428 MS
VENTRICULAR RATE: 57 BPM

## 2024-10-11 PROCEDURE — 1036F TOBACCO NON-USER: CPT | Performed by: INTERNAL MEDICINE

## 2024-10-11 PROCEDURE — 99215 OFFICE O/P EST HI 40 MIN: CPT | Performed by: INTERNAL MEDICINE

## 2024-10-11 PROCEDURE — 93005 ELECTROCARDIOGRAM TRACING: CPT | Performed by: INTERNAL MEDICINE

## 2024-10-11 PROCEDURE — 3078F DIAST BP <80 MM HG: CPT | Performed by: INTERNAL MEDICINE

## 2024-10-11 PROCEDURE — 3074F SYST BP LT 130 MM HG: CPT | Performed by: INTERNAL MEDICINE

## 2024-10-11 PROCEDURE — 3008F BODY MASS INDEX DOCD: CPT | Performed by: INTERNAL MEDICINE

## 2024-10-11 ASSESSMENT — PATIENT HEALTH QUESTIONNAIRE - PHQ9
SUM OF ALL RESPONSES TO PHQ9 QUESTIONS 1 AND 2: 0
2. FEELING DOWN, DEPRESSED OR HOPELESS: NOT AT ALL
2. FEELING DOWN, DEPRESSED OR HOPELESS: NOT AT ALL
1. LITTLE INTEREST OR PLEASURE IN DOING THINGS: NOT AT ALL
SUM OF ALL RESPONSES TO PHQ9 QUESTIONS 1 & 2: 0
1. LITTLE INTEREST OR PLEASURE IN DOING THINGS: NOT AT ALL

## 2024-10-11 ASSESSMENT — COLUMBIA-SUICIDE SEVERITY RATING SCALE - C-SSRS
2. HAVE YOU ACTUALLY HAD ANY THOUGHTS OF KILLING YOURSELF?: NO
6. HAVE YOU EVER DONE ANYTHING, STARTED TO DO ANYTHING, OR PREPARED TO DO ANYTHING TO END YOUR LIFE?: NO
1. IN THE PAST MONTH, HAVE YOU WISHED YOU WERE DEAD OR WISHED YOU COULD GO TO SLEEP AND NOT WAKE UP?: NO

## 2024-10-21 ENCOUNTER — LAB (OUTPATIENT)
Dept: LAB | Facility: LAB | Age: 49
End: 2024-10-21
Payer: COMMERCIAL

## 2024-10-21 DIAGNOSIS — Z01.818 PREOP TESTING: ICD-10-CM

## 2024-10-21 DIAGNOSIS — I47.10 SVT (SUPRAVENTRICULAR TACHYCARDIA) (CMS-HCC): ICD-10-CM

## 2024-10-21 LAB
ANION GAP SERPL CALC-SCNC: 12 MMOL/L (ref 10–20)
BUN SERPL-MCNC: 11 MG/DL (ref 6–23)
CALCIUM SERPL-MCNC: 9.2 MG/DL (ref 8.6–10.3)
CHLORIDE SERPL-SCNC: 105 MMOL/L (ref 98–107)
CO2 SERPL-SCNC: 27 MMOL/L (ref 21–32)
CREAT SERPL-MCNC: 1.37 MG/DL (ref 0.5–1.3)
EGFRCR SERPLBLD CKD-EPI 2021: 63 ML/MIN/1.73M*2
ERYTHROCYTE [DISTWIDTH] IN BLOOD BY AUTOMATED COUNT: 17.5 % (ref 11.5–14.5)
GLUCOSE SERPL-MCNC: 103 MG/DL (ref 74–99)
HCT VFR BLD AUTO: 42.7 % (ref 41–52)
HGB BLD-MCNC: 14.1 G/DL (ref 13.5–17.5)
MCH RBC QN AUTO: 30.1 PG (ref 26–34)
MCHC RBC AUTO-ENTMCNC: 33 G/DL (ref 32–36)
MCV RBC AUTO: 91 FL (ref 80–100)
NRBC BLD-RTO: 0 /100 WBCS (ref 0–0)
PLATELET # BLD AUTO: 275 X10*3/UL (ref 150–450)
POTASSIUM SERPL-SCNC: 5 MMOL/L (ref 3.5–5.3)
RBC # BLD AUTO: 4.69 X10*6/UL (ref 4.5–5.9)
SODIUM SERPL-SCNC: 139 MMOL/L (ref 136–145)
WBC # BLD AUTO: 5.9 X10*3/UL (ref 4.4–11.3)

## 2024-10-21 PROCEDURE — 36415 COLL VENOUS BLD VENIPUNCTURE: CPT

## 2024-10-21 PROCEDURE — 80048 BASIC METABOLIC PNL TOTAL CA: CPT

## 2024-10-21 PROCEDURE — 85027 COMPLETE CBC AUTOMATED: CPT

## 2024-10-25 PROCEDURE — RXMED WILLOW AMBULATORY MEDICATION CHARGE

## 2024-10-28 ENCOUNTER — PHARMACY VISIT (OUTPATIENT)
Dept: PHARMACY | Facility: CLINIC | Age: 49
End: 2024-10-28
Payer: COMMERCIAL

## 2024-10-28 ENCOUNTER — APPOINTMENT (OUTPATIENT)
Dept: PRIMARY CARE | Facility: CLINIC | Age: 49
End: 2024-10-28
Payer: COMMERCIAL

## 2024-10-28 VITALS
BODY MASS INDEX: 29.62 KG/M2 | WEIGHT: 200 LBS | SYSTOLIC BLOOD PRESSURE: 143 MMHG | HEIGHT: 69 IN | DIASTOLIC BLOOD PRESSURE: 90 MMHG

## 2024-10-28 DIAGNOSIS — F17.210 CIGARETTE NICOTINE DEPENDENCE WITHOUT COMPLICATION: Primary | ICD-10-CM

## 2024-10-28 DIAGNOSIS — I10 PRIMARY HYPERTENSION: ICD-10-CM

## 2024-10-28 DIAGNOSIS — Z23 FLU VACCINE NEED: ICD-10-CM

## 2024-10-28 DIAGNOSIS — E66.811 OBESITY, CLASS I, BMI 30-34.9: ICD-10-CM

## 2024-10-28 DIAGNOSIS — I47.10 SVT (SUPRAVENTRICULAR TACHYCARDIA) (CMS-HCC): ICD-10-CM

## 2024-10-28 PROBLEM — R06.02 SOB (SHORTNESS OF BREATH): Status: RESOLVED | Noted: 2024-06-17 | Resolved: 2024-10-28

## 2024-10-28 PROBLEM — M54.50 CHRONIC BILATERAL LOW BACK PAIN WITHOUT SCIATICA: Status: RESOLVED | Noted: 2022-08-11 | Resolved: 2024-10-28

## 2024-10-28 PROBLEM — G89.29 CHRONIC BILATERAL LOW BACK PAIN WITHOUT SCIATICA: Status: RESOLVED | Noted: 2022-08-11 | Resolved: 2024-10-28

## 2024-10-28 PROBLEM — R55 SYNCOPE: Status: RESOLVED | Noted: 2023-05-10 | Resolved: 2024-10-28

## 2024-10-28 PROCEDURE — 3008F BODY MASS INDEX DOCD: CPT

## 2024-10-28 PROCEDURE — 90656 IIV3 VACC NO PRSV 0.5 ML IM: CPT

## 2024-10-28 PROCEDURE — 90471 IMMUNIZATION ADMIN: CPT

## 2024-10-28 PROCEDURE — 99406 BEHAV CHNG SMOKING 3-10 MIN: CPT

## 2024-10-28 PROCEDURE — 99214 OFFICE O/P EST MOD 30 MIN: CPT

## 2024-10-28 PROCEDURE — 4004F PT TOBACCO SCREEN RCVD TLK: CPT

## 2024-10-28 PROCEDURE — 3080F DIAST BP >= 90 MM HG: CPT

## 2024-10-28 PROCEDURE — 3077F SYST BP >= 140 MM HG: CPT

## 2024-10-28 ASSESSMENT — LIFESTYLE VARIABLES
HOW OFTEN DO YOU HAVE A DRINK CONTAINING ALCOHOL: 2-3 TIMES A WEEK
HOW MANY STANDARD DRINKS CONTAINING ALCOHOL DO YOU HAVE ON A TYPICAL DAY: 1 OR 2

## 2024-11-05 ENCOUNTER — ANESTHESIA (OUTPATIENT)
Dept: CARDIOLOGY | Facility: HOSPITAL | Age: 49
End: 2024-11-05
Payer: COMMERCIAL

## 2024-11-05 ENCOUNTER — ANESTHESIA EVENT (OUTPATIENT)
Dept: CARDIOLOGY | Facility: HOSPITAL | Age: 49
End: 2024-11-05
Payer: COMMERCIAL

## 2024-11-05 ENCOUNTER — HOSPITAL ENCOUNTER (OUTPATIENT)
Facility: HOSPITAL | Age: 49
Setting detail: OUTPATIENT SURGERY
Discharge: HOME | End: 2024-11-05
Attending: INTERNAL MEDICINE | Admitting: INTERNAL MEDICINE
Payer: COMMERCIAL

## 2024-11-05 VITALS
SYSTOLIC BLOOD PRESSURE: 153 MMHG | HEART RATE: 91 BPM | RESPIRATION RATE: 12 BRPM | OXYGEN SATURATION: 100 % | DIASTOLIC BLOOD PRESSURE: 93 MMHG

## 2024-11-05 DIAGNOSIS — I47.10 SVT (SUPRAVENTRICULAR TACHYCARDIA) (CMS-HCC): ICD-10-CM

## 2024-11-05 LAB
ACT BLD: 142 SEC (ref 82–174)
ACT BLD: 170 SEC (ref 82–174)
ACT BLD: 173 SEC (ref 82–174)
HCV AB SER QL: NONREACTIVE
HIV 1+2 AB+HIV1 P24 AG SERPL QL IA: NONREACTIVE

## 2024-11-05 PROCEDURE — A93653 PR EPHYS EVAL W/ABLATION SUPRAVENT ARRHYTHMIA: Performed by: ANESTHESIOLOGY

## 2024-11-05 PROCEDURE — C1760 CLOSURE DEV, VASC: HCPCS | Performed by: INTERNAL MEDICINE

## 2024-11-05 PROCEDURE — C1730 CATH, EP, 19 OR FEW ELECT: HCPCS | Performed by: INTERNAL MEDICINE

## 2024-11-05 PROCEDURE — C1766 INTRO/SHEATH,STRBLE,NON-PEEL: HCPCS | Performed by: INTERNAL MEDICINE

## 2024-11-05 PROCEDURE — 3700000012 HC SEDATION LEVEL 5+ TIME - INITIAL 15 MINUTES 5/> YEARS: Performed by: INTERNAL MEDICINE

## 2024-11-05 PROCEDURE — C1887 CATHETER, GUIDING: HCPCS | Performed by: INTERNAL MEDICINE

## 2024-11-05 PROCEDURE — 2500000004 HC RX 250 GENERAL PHARMACY W/ HCPCS (ALT 636 FOR OP/ED): Performed by: ANESTHESIOLOGIST ASSISTANT

## 2024-11-05 PROCEDURE — 2720000007 HC OR 272 NO HCPCS: Performed by: INTERNAL MEDICINE

## 2024-11-05 PROCEDURE — 99153 MOD SED SAME PHYS/QHP EA: CPT | Performed by: INTERNAL MEDICINE

## 2024-11-05 PROCEDURE — 36415 COLL VENOUS BLD VENIPUNCTURE: CPT | Performed by: STUDENT IN AN ORGANIZED HEALTH CARE EDUCATION/TRAINING PROGRAM

## 2024-11-05 PROCEDURE — 3700000013 HC SEDATION LEVEL 5+ TIME - EACH ADDITIONAL 15 MINUTES: Performed by: INTERNAL MEDICINE

## 2024-11-05 PROCEDURE — 93662 INTRACARDIAC ECG (ICE): CPT | Performed by: INTERNAL MEDICINE

## 2024-11-05 PROCEDURE — 76937 US GUIDE VASCULAR ACCESS: CPT | Performed by: INTERNAL MEDICINE

## 2024-11-05 PROCEDURE — G0269 OCCLUSIVE DEVICE IN VEIN ART: HCPCS | Performed by: INTERNAL MEDICINE

## 2024-11-05 PROCEDURE — 7100000010 HC PHASE TWO TIME - EACH INCREMENTAL 1 MINUTE: Performed by: INTERNAL MEDICINE

## 2024-11-05 PROCEDURE — 85347 COAGULATION TIME ACTIVATED: CPT

## 2024-11-05 PROCEDURE — 3700000002 HC GENERAL ANESTHESIA TIME - EACH INCREMENTAL 1 MINUTE: Performed by: INTERNAL MEDICINE

## 2024-11-05 PROCEDURE — 3700000001 HC GENERAL ANESTHESIA TIME - INITIAL BASE CHARGE: Performed by: INTERNAL MEDICINE

## 2024-11-05 PROCEDURE — 7100000009 HC PHASE TWO TIME - INITIAL BASE CHARGE: Performed by: INTERNAL MEDICINE

## 2024-11-05 PROCEDURE — C1732 CATH, EP, DIAG/ABL, 3D/VECT: HCPCS | Performed by: INTERNAL MEDICINE

## 2024-11-05 PROCEDURE — 99152 MOD SED SAME PHYS/QHP 5/>YRS: CPT | Performed by: INTERNAL MEDICINE

## 2024-11-05 PROCEDURE — 93623 PRGRMD STIMJ&PACG IV RX NFS: CPT | Performed by: INTERNAL MEDICINE

## 2024-11-05 PROCEDURE — 85347 COAGULATION TIME ACTIVATED: CPT | Performed by: INTERNAL MEDICINE

## 2024-11-05 PROCEDURE — 86803 HEPATITIS C AB TEST: CPT | Performed by: STUDENT IN AN ORGANIZED HEALTH CARE EDUCATION/TRAINING PROGRAM

## 2024-11-05 PROCEDURE — 93462 L HRT CATH TRNSPTL PUNCTURE: CPT | Performed by: INTERNAL MEDICINE

## 2024-11-05 PROCEDURE — 2780000003 HC OR 278 NO HCPCS: Performed by: INTERNAL MEDICINE

## 2024-11-05 PROCEDURE — 93653 COMPRE EP EVAL TX SVT: CPT | Performed by: INTERNAL MEDICINE

## 2024-11-05 PROCEDURE — C1759 CATH, INTRA ECHOCARDIOGRAPHY: HCPCS | Performed by: INTERNAL MEDICINE

## 2024-11-05 PROCEDURE — 87389 HIV-1 AG W/HIV-1&-2 AB AG IA: CPT | Performed by: STUDENT IN AN ORGANIZED HEALTH CARE EDUCATION/TRAINING PROGRAM

## 2024-11-05 PROCEDURE — A93653 PR EPHYS EVAL W/ABLATION SUPRAVENT ARRHYTHMIA: Performed by: ANESTHESIOLOGIST ASSISTANT

## 2024-11-05 PROCEDURE — 92960 CARDIOVERSION ELECTRIC EXT: CPT | Performed by: INTERNAL MEDICINE

## 2024-11-05 PROCEDURE — 2500000004 HC RX 250 GENERAL PHARMACY W/ HCPCS (ALT 636 FOR OP/ED): Performed by: INTERNAL MEDICINE

## 2024-11-05 RX ORDER — PROPOFOL 10 MG/ML
INJECTION, EMULSION INTRAVENOUS AS NEEDED
Status: DISCONTINUED | OUTPATIENT
Start: 2024-11-05 | End: 2024-11-05

## 2024-11-05 RX ORDER — HEPARIN SODIUM 10000 [USP'U]/100ML
INJECTION, SOLUTION INTRAVENOUS CONTINUOUS PRN
Status: DISCONTINUED | OUTPATIENT
Start: 2024-11-05 | End: 2024-11-05 | Stop reason: HOSPADM

## 2024-11-05 RX ORDER — FENTANYL CITRATE 50 UG/ML
INJECTION, SOLUTION INTRAMUSCULAR; INTRAVENOUS AS NEEDED
Status: DISCONTINUED | OUTPATIENT
Start: 2024-11-05 | End: 2024-11-05 | Stop reason: HOSPADM

## 2024-11-05 RX ORDER — LIDOCAINE HYDROCHLORIDE 20 MG/ML
INJECTION, SOLUTION INFILTRATION; PERINEURAL AS NEEDED
Status: DISCONTINUED | OUTPATIENT
Start: 2024-11-05 | End: 2024-11-05 | Stop reason: HOSPADM

## 2024-11-05 RX ORDER — HEPARIN SODIUM 1000 [USP'U]/ML
INJECTION, SOLUTION INTRAVENOUS; SUBCUTANEOUS AS NEEDED
Status: DISCONTINUED | OUTPATIENT
Start: 2024-11-05 | End: 2024-11-05 | Stop reason: HOSPADM

## 2024-11-05 RX ORDER — MIDAZOLAM HYDROCHLORIDE 1 MG/ML
INJECTION, SOLUTION INTRAMUSCULAR; INTRAVENOUS AS NEEDED
Status: DISCONTINUED | OUTPATIENT
Start: 2024-11-05 | End: 2024-11-05 | Stop reason: HOSPADM

## 2024-11-05 SDOH — HEALTH STABILITY: MENTAL HEALTH: CURRENT SMOKER: 0

## 2024-11-05 ASSESSMENT — COLUMBIA-SUICIDE SEVERITY RATING SCALE - C-SSRS
6. HAVE YOU EVER DONE ANYTHING, STARTED TO DO ANYTHING, OR PREPARED TO DO ANYTHING TO END YOUR LIFE?: NO
2. HAVE YOU ACTUALLY HAD ANY THOUGHTS OF KILLING YOURSELF?: NO
1. IN THE PAST MONTH, HAVE YOU WISHED YOU WERE DEAD OR WISHED YOU COULD GO TO SLEEP AND NOT WAKE UP?: NO

## 2024-11-05 ASSESSMENT — ENCOUNTER SYMPTOMS
PALPITATIONS: 1
FATIGUE: 1

## 2024-11-05 NOTE — ANESTHESIA PREPROCEDURE EVALUATION
Consults  Patient: Petr Baptiste    Procedure Information       Date/Time: 11/05/24 1200    Procedure: Ablation SVT - needs EPS, 3D with CARTO and RFA    Location: [facility] BIPLANE / Virtual [facility] MAT 3529 Cardiac Cath Lab    Providers: Brady Castrejon MD            Relevant Problems   Cardiac   (+) Coronary artery disease involving native coronary artery of native heart without angina pectoris   (+) Hypertension   (+) SVT (supraventricular tachycardia) (CMS-HCC)   (+) WPW (Karime-Parkinson-White syndrome)       Clinical information reviewed:    Allergies  Meds                Physical Exam    Airway  Mallampati: II  Neck ROM: full     Cardiovascular    Dental - normal exam     Pulmonary    Abdominal          Anesthesia Plan    History of general anesthesia?: yes  History of complications of general anesthesia?: no    ASA 2     general     The patient is not a current smoker.    intravenous induction   Anesthetic plan and risks discussed with patient.    Plan discussed with attending and CRNA.      Anesthesia History  General Anesthesia without Complication    Cardiac  HTN  SVT, multiple conversions in past with adenosine  WPW  H/O syncopal episodes  Non-obstructive CAD    Pulmonary  Normal    Neuro  Normal    GI  Normal      /Renal  Acute versus CKD, elevated serum creatinine.    Liver  Normal    Endocrine  Normal    Hematology  None    Musculoskeletal  Normal    HEENT  None    ID  None      Allergies   Allergic to Penicillin**    Social  None    Meds  -     NPO Status  NPO since Midnight

## 2024-11-05 NOTE — H&P
History Of Present Illness  Petr Baptiste is a 49 y.o. male presenting with SVT.     Past Medical History  No past medical history on file.    Surgical History  No past surgical history on file.     Social History  He reports that he has been smoking cigarettes. He started smoking about 32 years ago. He has a 32.6 pack-year smoking history. He has been exposed to tobacco smoke. He has never used smokeless tobacco. He reports current alcohol use of about 2.0 standard drinks of alcohol per week. He reports current drug use. Drug: Marijuana.    Family History  No family history on file.     Allergies  Penicillins    Review of Systems   Constitutional:  Positive for fatigue.   Cardiovascular:  Positive for palpitations.   All other systems reviewed and are negative.       Physical Exam  Vitals reviewed.   Constitutional:       General: He is not in acute distress.     Appearance: Normal appearance. He is normal weight.   HENT:      Head: Normocephalic and atraumatic.      Nose: Nose normal. No congestion or rhinorrhea.      Mouth/Throat:      Mouth: Mucous membranes are moist.      Pharynx: Oropharynx is clear. No oropharyngeal exudate or posterior oropharyngeal erythema.   Eyes:      Extraocular Movements: Extraocular movements intact.      Conjunctiva/sclera: Conjunctivae normal.      Pupils: Pupils are equal, round, and reactive to light.   Neck:      Vascular: No carotid bruit.   Cardiovascular:      Rate and Rhythm: Normal rate and regular rhythm.      Pulses: Normal pulses.      Heart sounds: Normal heart sounds. No murmur heard.     No friction rub. No gallop.   Pulmonary:      Effort: Pulmonary effort is normal. No respiratory distress.      Breath sounds: Normal breath sounds. No wheezing or rales.   Abdominal:      General: Abdomen is flat. Bowel sounds are normal. There is no distension.      Palpations: Abdomen is soft.      Tenderness: There is no abdominal tenderness.   Musculoskeletal:         General:  No swelling. Normal range of motion.      Cervical back: Normal range of motion.   Lymphadenopathy:      Cervical: No cervical adenopathy.   Skin:     General: Skin is warm and dry.      Capillary Refill: Capillary refill takes less than 2 seconds.      Findings: No erythema, lesion or rash.   Neurological:      General: No focal deficit present.      Mental Status: He is alert and oriented to person, place, and time. Mental status is at baseline.   Psychiatric:         Mood and Affect: Mood normal.         Behavior: Behavior normal.         Thought Content: Thought content normal.         Judgment: Judgment normal.         Asa iii  Mal iii      Last Recorded Vitals  There were no vitals taken for this visit.    Relevant Results        Results reviewed      Assessment/Plan   Assessment & Plan  SVT (supraventricular tachycardia) (CMS-[facility])      SVT  RFA, mod sedation        I spent 30 minutes in the professional and overall care of this patient.      Dean Harris MD

## 2024-11-05 NOTE — DISCHARGE INSTRUCTIONS
INSTRUCTIONS AFTER ABLATION PROCEDURE:    *we are starting a medication called eliquis (apixaban). This will be 5mg twice per day for 30 days only. We need to do a month of this medication because we had to shock your heart (cardioversion) during the procedure, and because we performed ablation in the left side of your heart. It will not be a permanent medication. Please take this for 30 days only, then stop (unless directed otherwise by your doctor).     * In the first week after ablation you should take it easy. No heavy lifting or heavy exertion, no treadmill.  You can use the stairs if needed but go slowly and minimize the number of times up and down.    * Some minor bruising is common at each groin access site with minor soreness as if you had banged the area. Bruising may occasionally be seen to extend down the leg. This is normal as is an occasional small quarter sized bump in the area. If larger swelling or more significant pain occurs at the area, please contact the office or go the nearest Emergency Room.    * All medications will generally remain the same unless you are told otherwise.  Resume taking your home medications today as listed on the discharge instructions.    *Continue to follow up with your primary cardiologist, primary care physician, and any other specialists you normally see.    *No driving for 2 days post procedure (IF you were driving prior to procedure).    *Diet: Heart healthy    Call Provider If:  Breathing faster than normal.     Fever of 100.4 F (38 C) or higher.     Chills.     Any new concerning symptoms.     Passing out.     Patient Instructions, Next 24 hours:  DO NOT drive a car, operate machinery or power tools.  It is recommended that a responsible adult be with you for the first 24 hours.     DO NOT drink any alcoholic drinks or take any non-prescriptive medications that contain alcohol for the first 24 hours.     DO NOT make any important decisions for the first 24  hours.    Activity:  You are advised to go directly home from the hospital.     DO NOT lift anything heavier than 10 pounds for one week, this allows for proper healing of the groin.     No excessive exercise or treadmill use for one week. You may walk and do stairs, slowly.     No sexual activities for 24 hours after you arrive home.    Wound Care:  If slight bleeding should occur at site, lie down and have someone apply firm pressure just above the puncture site for 5 minutes.  If it continues or is profuse, call 911. Always notify your doctor if bleeding occurs.     Keep site clean and dry. Let air dry or you may use a simple bandaid.     Gently cleanse the puncture site in your groin with soap and water only.     You may experience some tenderness, bruising or minimal inflammation.  If you have any concerns, you may contact the Cath Lab or if any of these symptoms become excessive, contact your cardiologist or go to the emergency room.     No tub baths, soaking, or swimming for one week.     May shower the next day after your procedure.    If you have any questions about the effects of the sedative drugs or groin care, please call the physician who performed your procedure.    FOLLOW UP:   1) Reshma Hernandez CNP ( Electrophysiology) 1 month after ablation   will notify you of appointment. If you haven't heard in 1 week, please call [phone].

## 2024-11-05 NOTE — ANESTHESIA POSTPROCEDURE EVALUATION
Patient: Petr Baptiste    Procedure Summary       Date: 11/05/24 Room / Location: [facility] BIPLANE / Virtual [facility] MAT 3529 Cardiac Cath Lab    Anesthesia Start: 1245 Anesthesia Stop:     Procedure: Ablation SVT Diagnosis:       SVT (supraventricular tachycardia) (CMS-HCC)      (SVT (supraventricular tachycardia) (CMS-HCC) [I47.10])    Providers: Brady Castrejon MD Responsible Provider: Ella Butler MD    Anesthesia Type: general ASA Status: 2            Anesthesia Type: general    Vitals Value Taken Time   /100 11/05/24 1324   Temp 36.2 11/05/24 1324   Pulse 87 11/05/24 1324   Resp 16 11/05/24 1324   SpO2 100 11/05/24 1324       Anesthesia Post Evaluation    Patient participation: complete - patient participated  Level of consciousness: sleepy but conscious  Pain management: adequate  Airway patency: patent  Cardiovascular status: acceptable  Respiratory status: acceptable, nonlabored ventilation, spontaneous ventilation and nasal cannula  Hydration status: acceptable  Postoperative Nausea and Vomiting: none        There were no known notable events for this encounter.

## 2024-11-05 NOTE — CONSULTS
Consults  Patient: Petr Baptiste    Procedure Information       Date/Time: 11/05/24 1200    Procedure: Ablation SVT - needs EPS, 3D with CARTO and RFA    Location: [facility] BIPLANE / Virtual [facility] MAT 3529 Cardiac Cath Lab    Providers: Brady Castrejon MD                Clinical information reviewed:                    Physical Exam    Airway  Mallampati: II  Neck ROM: full     Cardiovascular    Dental - normal exam     Pulmonary    Abdominal            Anesthesia Plan    History of general anesthesia?: yes  History of complications of general anesthesia?: no    ASA 2     general     The patient is not a current smoker.    Anesthetic plan and risks discussed with patient.    Plan discussed with attending.        Anesthesia History  General Anesthesia without Complication    Cardiac  HTN  SVT, multiple conversions in past with adenosine  WPW  H/O syncopal episodes  Non-obstructive CAD    Pulmonary  Normal    Neuro  Normal    GI  Normal      /Renal  Acute versus CKD, elevated serum creatinine.    Liver  Normal    Endocrine  Normal    Hematology  None    Musculoskeletal  Normal    HEENT  None    ID  None      Allergies   Allergic to Penicillin**    Social  None    Meds  -     NPO Status  NPO since Midnight

## 2024-12-03 ENCOUNTER — OFFICE VISIT (OUTPATIENT)
Dept: CARDIOLOGY | Facility: CLINIC | Age: 49
End: 2024-12-03
Payer: COMMERCIAL

## 2024-12-03 VITALS
HEIGHT: 69 IN | DIASTOLIC BLOOD PRESSURE: 78 MMHG | RESPIRATION RATE: 18 BRPM | OXYGEN SATURATION: 98 % | HEART RATE: 82 BPM | BODY MASS INDEX: 30.41 KG/M2 | WEIGHT: 205.3 LBS | SYSTOLIC BLOOD PRESSURE: 116 MMHG

## 2024-12-03 DIAGNOSIS — I47.10 SVT (SUPRAVENTRICULAR TACHYCARDIA) (CMS-HCC): Primary | ICD-10-CM

## 2024-12-03 LAB
ATRIAL RATE: 82 BPM
P AXIS: 75 DEGREES
P OFFSET: 212 MS
P ONSET: 156 MS
PR INTERVAL: 138 MS
Q ONSET: 225 MS
QRS COUNT: 14 BEATS
QRS DURATION: 90 MS
QT INTERVAL: 392 MS
QTC CALCULATION(BAZETT): 457 MS
QTC FREDERICIA: 435 MS
R AXIS: 30 DEGREES
T AXIS: 24 DEGREES
T OFFSET: 421 MS
VENTRICULAR RATE: 82 BPM

## 2024-12-03 PROCEDURE — 3074F SYST BP LT 130 MM HG: CPT | Performed by: NURSE PRACTITIONER

## 2024-12-03 PROCEDURE — 3008F BODY MASS INDEX DOCD: CPT | Performed by: NURSE PRACTITIONER

## 2024-12-03 PROCEDURE — 99214 OFFICE O/P EST MOD 30 MIN: CPT | Performed by: NURSE PRACTITIONER

## 2024-12-03 PROCEDURE — 93005 ELECTROCARDIOGRAM TRACING: CPT | Performed by: NURSE PRACTITIONER

## 2024-12-03 PROCEDURE — 3078F DIAST BP <80 MM HG: CPT | Performed by: NURSE PRACTITIONER

## 2024-12-03 RX ORDER — DILTIAZEM HYDROCHLORIDE 120 MG/1
120 CAPSULE, COATED, EXTENDED RELEASE ORAL DAILY
Qty: 90 CAPSULE | Refills: 1 | Status: SHIPPED | OUTPATIENT
Start: 2024-12-03 | End: 2025-06-01

## 2024-12-03 NOTE — PATIENT INSTRUCTIONS
Thank you for coming to see me today!  Please call [phone] to schedule a follow up with me in 2-3 months.  I also see patients at [facility] and [facility] if those locations work better for you    We reduced your diltiazem to 120 mg today, we will think about stopping it at our next visit  Continue all other medications for now    If you still get dizzy frequently, you can call the office but I would also recommend monitoring your blood pressure at home.    Reshma Hernandez APRN-CNP  [phone]

## 2024-12-05 ASSESSMENT — ENCOUNTER SYMPTOMS
BLURRED VISION: 0
NAUSEA: 0
HEMOPTYSIS: 0
DIAPHORESIS: 0
FEVER: 0
MYALGIAS: 0
WEAKNESS: 0
SYNCOPE: 0
ORTHOPNEA: 0
NEAR-SYNCOPE: 0
ABDOMINAL PAIN: 0
VOMITING: 0
PND: 0
DIARRHEA: 0
LIGHT-HEADEDNESS: 0
DOUBLE VISION: 0
PALPITATIONS: 0
DIZZINESS: 1
SPUTUM PRODUCTION: 0
SNORING: 0
FALLS: 0
DYSPNEA ON EXERTION: 0
SHORTNESS OF BREATH: 0
COUGH: 0
IRREGULAR HEARTBEAT: 0
HEADACHES: 0
SORE THROAT: 0

## 2024-12-05 NOTE — PROGRESS NOTES
"Subjective   Petr Baptiste is a 49 y.o. male.    Chief Complaint:  SVT    Petr Baptiste is a 49 y.o. with:   Pmh includes HTN, Obesity, JOSUE, and Shortness of Breath.    Cardiac Hx:    1. Syncope  2. Non-obstructive CAD - noted on CT coronary angio (notable mostly for a 50-60% proximal RCA lesion)   3. WPW  4. SVT - (index dx 6/2024)     Now s/p endocardial AVRT RFA of left lateral accessory pathway with Dr. Castrejon 11/5/2024  ECG 12/3/2024 NSR HR 82 bpm    TODAY Patient presents for 1 month follow-up post SVT ablation.  He is feeling pretty good, but he had a few episodes where he felt like his heart was about to start racing, but it did not.  That feeling always makes him anxious.  He denies any chest pain, shortness of breath or syncope.  He is having some orthostatic dizziness both when he stands up or if he bends forward    /78 (BP Location: Left arm, Patient Position: Sitting, BP Cuff Size: Adult)   Pulse 82   Resp 18   Ht 1.753 m (5' 9\")   Wt 93.1 kg (205 lb 4.8 oz)   SpO2 98%   BMI 30.32 kg/m²   Current Outpatient Medications on File Prior to Visit   Medication Sig Dispense Refill    atorvastatin (Lipitor) 40 mg tablet Take 1 tablet (40 mg) by mouth once daily at bedtime. 30 tablet 11    cloNIDine (Catapres) 0.2 mg tablet Take 1 tablet (0.2 mg) by mouth every 8 hours. 90 tablet 11    losartan (Cozaar) 100 mg tablet Take 1 tablet (100 mg) by mouth once daily. 30 tablet 11    spironolactone (Aldactone) 25 mg tablet Take 1 tablet (25 mg) by mouth once daily. 30 tablet 11    [DISCONTINUED] dilTIAZem XR (Dilacor XR) 240 mg 24 hr capsule Take 1 capsule (240 mg) by mouth once daily. 30 capsule 11    [DISCONTINUED] apixaban (Eliquis) 5 mg tablet Take 1 tablet (5 mg) by mouth 2 times a day. 60 tablet 0     No current facility-administered medications on file prior to visit.         Review of Systems   Constitutional: Negative for diaphoresis, fever and malaise/fatigue.   HENT:  Negative for congestion " and sore throat.    Eyes:  Negative for blurred vision and double vision.   Cardiovascular:  Negative for chest pain, dyspnea on exertion, irregular heartbeat, leg swelling, near-syncope, orthopnea, palpitations, paroxysmal nocturnal dyspnea and syncope.   Respiratory:  Negative for cough, hemoptysis, shortness of breath, snoring and sputum production.    Hematologic/Lymphatic: Negative for bleeding problem.   Skin:  Negative for rash.   Musculoskeletal:  Negative for falls, joint pain and myalgias.   Gastrointestinal:  Negative for abdominal pain, diarrhea, nausea and vomiting.   Neurological:  Positive for dizziness. Negative for headaches, light-headedness and weakness.   All other systems reviewed and are negative.      Objective   Constitutional:       Appearance: Healthy appearance. Not in distress.   Eyes:      Conjunctiva/sclera: Conjunctivae normal.   HENT:      Nose: Nose normal.    Mouth/Throat:      Pharynx: Oropharynx is clear.   Neck:      Vascular: No JVR. JVD normal.   Pulmonary:      Effort: Pulmonary effort is normal.      Breath sounds: Normal breath sounds. No wheezing. No rhonchi.   Chest:      Chest wall: Not tender to palpatation.   Cardiovascular:      Normal rate. Regular rhythm.      Murmurs: There is no murmur.      No rub.   Pulses:     Intact distal pulses.   Edema:     Peripheral edema absent.   Abdominal:      General: Bowel sounds are normal.      Palpations: Abdomen is soft.   Musculoskeletal: Normal range of motion.      Cervical back: Neck supple. Skin:     General: Skin is warm and dry.      Comments: Right groin site well approximated, no bruising/bleeding/swelling/redness/pain   Neurological:      Mental Status: Alert and oriented to person, place and time.      Motor: Motor function is intact.     I personally reviewed his most recent KEN, ECGs, CBC amdn BMP    Lab Review:   Lab Results   Component Value Date     10/21/2024    K 5.0 10/21/2024     10/21/2024    CO2  27 10/21/2024    BUN 11 10/21/2024    CREATININE 1.37 (H) 10/21/2024    GLUCOSE 103 (H) 10/21/2024    CALCIUM 9.2 10/21/2024     Lab Results   Component Value Date    WBC 5.9 10/21/2024    HGB 14.1 10/21/2024    HCT 42.7 10/21/2024    MCV 91 10/21/2024     10/21/2024       Assessment/Plan   The encounter diagnosis was SVT (supraventricular tachycardia) (CMS-HCC).  Patient presents today for 1 month follow-up post SVT ablation.  We reviewed his ongoing symptoms postprocedure, these are pretty normal symptoms that occur.  The fact that he feels his heart about to start racing and then it does not is actually a good sign that the ablation is working.  The sensation should decrease over time.  Patient is having some orthostatic dizziness, we will reduce his diltiazem today to see if this helps his symptoms.  He is on many medications for his blood pressure.  We discussed normal symptoms that can happen post ablation and when he should go to the ED or have an office visit.  Patient expresses understanding, all questions asked and answered.    Diltiazem 120 mg daily  Follow-up with me in 2 to 3 months, we will consider stopping the diltiazem at that time if able  Follow-up with general cardiology as scheduled  Patient to call the office with any questions or concerns, I encouraged him to monitor his blood pressure at home

## 2025-01-20 PROCEDURE — RXMED WILLOW AMBULATORY MEDICATION CHARGE

## 2025-01-21 ENCOUNTER — PHARMACY VISIT (OUTPATIENT)
Dept: PHARMACY | Facility: CLINIC | Age: 50
End: 2025-01-21
Payer: COMMERCIAL

## 2025-03-03 ENCOUNTER — OFFICE VISIT (OUTPATIENT)
Dept: CARDIOLOGY | Facility: CLINIC | Age: 50
End: 2025-03-03
Payer: COMMERCIAL

## 2025-03-03 VITALS
OXYGEN SATURATION: 99 % | BODY MASS INDEX: 30.04 KG/M2 | HEIGHT: 69 IN | RESPIRATION RATE: 16 BRPM | HEART RATE: 76 BPM | TEMPERATURE: 96.3 F | WEIGHT: 202.8 LBS | SYSTOLIC BLOOD PRESSURE: 151 MMHG | DIASTOLIC BLOOD PRESSURE: 115 MMHG

## 2025-03-03 DIAGNOSIS — I10 HYPERTENSION, UNSPECIFIED TYPE: ICD-10-CM

## 2025-03-03 DIAGNOSIS — F17.200 SMOKER: ICD-10-CM

## 2025-03-03 DIAGNOSIS — I47.10 SVT (SUPRAVENTRICULAR TACHYCARDIA) (CMS-HCC): ICD-10-CM

## 2025-03-03 DIAGNOSIS — I45.6 WPW (WOLFF-PARKINSON-WHITE SYNDROME): ICD-10-CM

## 2025-03-03 DIAGNOSIS — E78.2 MIXED HYPERLIPIDEMIA: ICD-10-CM

## 2025-03-03 DIAGNOSIS — I25.10 CORONARY ARTERY DISEASE INVOLVING NATIVE CORONARY ARTERY OF NATIVE HEART WITHOUT ANGINA PECTORIS: Primary | ICD-10-CM

## 2025-03-03 PROBLEM — E78.5 HYPERLIPIDEMIA: Status: ACTIVE | Noted: 2025-03-03

## 2025-03-03 PROCEDURE — 99406 BEHAV CHNG SMOKING 3-10 MIN: CPT | Performed by: REGISTERED NURSE

## 2025-03-03 PROCEDURE — 3080F DIAST BP >= 90 MM HG: CPT | Performed by: REGISTERED NURSE

## 2025-03-03 PROCEDURE — 99215 OFFICE O/P EST HI 40 MIN: CPT | Performed by: REGISTERED NURSE

## 2025-03-03 PROCEDURE — 93005 ELECTROCARDIOGRAM TRACING: CPT | Performed by: REGISTERED NURSE

## 2025-03-03 PROCEDURE — 99417 PROLNG OP E/M EACH 15 MIN: CPT | Performed by: REGISTERED NURSE

## 2025-03-03 PROCEDURE — 3008F BODY MASS INDEX DOCD: CPT | Performed by: REGISTERED NURSE

## 2025-03-03 PROCEDURE — 99215 OFFICE O/P EST HI 40 MIN: CPT | Mod: 25 | Performed by: REGISTERED NURSE

## 2025-03-03 PROCEDURE — 3077F SYST BP >= 140 MM HG: CPT | Performed by: REGISTERED NURSE

## 2025-03-03 RX ORDER — HYDROCHLOROTHIAZIDE 12.5 MG/1
12.5 TABLET ORAL DAILY
Qty: 30 TABLET | Refills: 11 | Status: SHIPPED | OUTPATIENT
Start: 2025-03-03 | End: 2026-03-03

## 2025-03-03 RX ORDER — CARVEDILOL 12.5 MG/1
12.5 TABLET ORAL
Qty: 60 TABLET | Refills: 11 | Status: SHIPPED | OUTPATIENT
Start: 2025-03-03 | End: 2026-03-03

## 2025-03-03 ASSESSMENT — ENCOUNTER SYMPTOMS
PALPITATIONS: 0
WEIGHT GAIN: 0
WEIGHT LOSS: 0
PND: 0
ORTHOPNEA: 0
NIGHT SWEATS: 0
IRREGULAR HEARTBEAT: 0
CLAUDICATION: 0
DYSPNEA ON EXERTION: 0
SYNCOPE: 0
NEAR-SYNCOPE: 0

## 2025-03-03 ASSESSMENT — PAIN SCALES - GENERAL: PAINLEVEL_OUTOF10: 6

## 2025-03-03 NOTE — ASSESSMENT & PLAN NOTE
Provided about 8 minutes of tobacco cessation counseling. He is motivated to quit, has used NRT in the past with success. Will continue to assess at each visit.

## 2025-03-03 NOTE — ASSESSMENT & PLAN NOTE
Labile while on 4 agents. Will discontinue clonidine and dilt, start carvedilol 12.5mg BID and hydrochlorothiazide 12.5mg pending labs. Continue losartan 100mg and spironolactone 25mg.  He will continue to monitor BP at home and provide log in 4 weeks.

## 2025-03-03 NOTE — LETTER
March 3, 2025     Patient: Petr Baptiste   YOB: 1975   Date of Visit: 3/3/2025       To Whom It May Concern:    It is my medical opinion that Petr Baptiste may return to light duty immediately with the following restrictions: no lifting and no bending at the waist until released from cardiac surgery .    If you have any questions or concerns, please don't hesitate to call.    Sincerely,        SWAPNIL Nava-CNP, DNP    CC: No Recipients

## 2025-03-03 NOTE — ASSESSMENT & PLAN NOTE
Nonobstructive, 50-60% proximal RCA lesion. Will continue to manage medically. Lipid panel pending. Continue atorvastatin 40mg.  Discuss ASA at next visit.

## 2025-03-03 NOTE — ASSESSMENT & PLAN NOTE
S/p successful ablation. EKG NSR without ectopy. No events per patient. Will discontinue diltiazem per EP note. But advised pt to follow up with EP regarding chronic femoral access site pain radiating transverse lower abdomen, likely MSK-related as he only took 1 week off from working at [employer] (heavy lifting) post procedure. Work release light duty note provided until seen by EP.

## 2025-03-03 NOTE — PROGRESS NOTES
Cardiology Clinic Note    Reason for Visit: New Patient Visit (npv).  Referring Clinician: Jayson Dudley     History of Present Illness: Petr Baptiste is a 49 y.o. male who presents for New Patient Visit (npv). His PMHx relevant for SVT/WPW s/p ablation in November, current daily smoker, prediabetes, nonobstructive CAD (50-60% proximal RCA lesion), labile HTN on 4 agents. He presents today with complaints of chronic femoral access site pain that radiates lower transverse abdomen, interfering with his ability to lift heavy items at work at [employer]. He was was DOAC for 1 month post-procedure, no bleeding events or hematomas.   He denies syncopal events, palpitations, dyspnea on exertion, chest discomfort, orthopnea, PND, fatigue.   He regularly checks his blood pressure and intermittently has good control.     Past Medical History:  He has no past medical history on file.    Past Surgical History:  He has a past surgical history that includes Cardiac electrophysiology procedure (N/A, 11/5/2024).    Social History:  He reports that he has been smoking cigarettes. He started smoking about 33 years ago. He has a 32.7 pack-year smoking history. He has been exposed to tobacco smoke. He has never used smokeless tobacco. He reports current alcohol use of about 2.0 standard drinks of alcohol per week. He reports current drug use. Drug: Marijuana.    Family History:  No family history on file.    Allergies:  Penicillins    Outpatient Medications:  Current Outpatient Medications   Medication Instructions    atorvastatin (LIPITOR) 40 mg, oral, Nightly    carvedilol (COREG) 12.5 mg, oral, 2 times daily (morning and late afternoon)    hydroCHLOROthiazide (MICROZIDE) 12.5 mg, oral, Daily    losartan (COZAAR) 100 mg, oral, Daily    spironolactone (ALDACTONE) 25 mg, oral, Daily       Review of Systems:  Review of Systems   Constitutional: Negative for malaise/fatigue, night sweats, weight gain and weight loss.   Cardiovascular:   Due to COVID-19 ACTION PLAN, the patient's office visit was converted to a phone visit.    This patient verbally consents to a telephone visit.    Patient states they are Isabel Muñoz and that they are speaking to me from their home.     It has been 8 months since the patient's last in-office visit.    Patient notes the following changes in medical history since last visit:   Chief Complaint   Patient presents with   • Telephonic Visit   • Leg Pain     swelling        Review of Systems   Constitution: Negative.   HENT: Negative.    Cardiovascular: Negative.    Respiratory: Negative.    Endocrine: Negative.    Skin: Negative.    Musculoskeletal: Positive for arthritis and joint pain.   Gastrointestinal: Negative.    Genitourinary: Negative.    Neurological: Negative.    Psychiatric/Behavioral: Negative.         Patient offers the following concerns: Patient complains of knee pain with swelling and weakness. It has caused her to not be able to work and to lose her job    Patient is doing home BP checks: No    The following testing was reviewed during this phone visit: Prior x-rays    Medication and allergy reconciliation completed over the phone.     The following problems were addressed during today's call:  DJD      The following was ordered during today's call:   No orders of the defined types were placed in this encounter.      Time spent talking with patient during today's call: 10-15 minutes    Testing should be completed prior to next visit. New prescriptions / refills sent to the pharmacy.    Patient was advised to call if they experience any new or worsening symptoms.    No follow-ups on file.   "Negative for chest pain, claudication, cyanosis, dyspnea on exertion, irregular heartbeat, leg swelling, near-syncope, orthopnea, palpitations, paroxysmal nocturnal dyspnea and syncope.   All other systems reviewed and are negative.      Last Recorded Vitals:  Vitals:    03/03/25 0944   BP: (!) 151/115   Pulse: 76   Resp: 16   Temp: 35.7 °C (96.3 °F)   TempSrc: Temporal   SpO2: 99%   Weight: 92 kg (202 lb 12.8 oz)   Height: 1.753 m (5' 9\")       Physical Examination:  GENERAL: NAD, central adiposity   HEENT: no JVD  CV: RRR without m/r/g  PULM: CTAB  EXT: non-edematous bilateral lower extremities  Femoral access site no hematomas, discoloration, but markedly TTP     Laboratory Studies:  Lab Results   Component Value Date    GLUCOSE 103 (H) 10/21/2024    CALCIUM 9.2 10/21/2024     10/21/2024    K 5.0 10/21/2024    CO2 27 10/21/2024     10/21/2024    BUN 11 10/21/2024    CREATININE 1.37 (H) 10/21/2024     Lab Results   Component Value Date    ALT 16 07/29/2024    AST 14 07/29/2024    ALKPHOS 59 07/29/2024    BILITOT 0.3 07/29/2024         Lab Results   Component Value Date    CHOL 142 07/29/2024    CHOL 248 (H) 06/17/2024     Lab Results   Component Value Date    HDL 50.6 07/29/2024    HDL 56.7 06/17/2024     Lab Results   Component Value Date    LDLCALC 81 07/29/2024    LDLCALC 174 (H) 06/17/2024     Lab Results   Component Value Date    TRIG 53 07/29/2024    TRIG 89 06/17/2024     No components found for: \"CHOLHDL\"  Lab Results   Component Value Date    HGBA1C 5.9 (H) 06/17/2024     No components found for: \"UACR\"    Cardiology Tests:  ECG:  ECG 12 lead (Clinic Performed) 12/03/2024      Echo:  Transthoracic Echo (TTE) Complete 06/17/2024      Cath:No results found for this or any previous visit from the past 1095 days.      Stress Test:No results found for this or any previous visit from the past 1095 days.      Cardiac Imaging:  CT angio coronary art with heartflow if score >30% 06/17/2024      The " 10-year ASCVD risk score (Mily ROBISON, et al., 2019) is: 17.8%    Values used to calculate the score:      Age: 49 years      Sex: Male      Is Non- : Yes      Diabetic: No      Tobacco smoker: Yes      Systolic Blood Pressure: 151 mmHg      Is BP treated: Yes      HDL Cholesterol: 50.6 mg/dL      Total Cholesterol: 142 mg/dL   I personally reviewed labs, diagnostic imaging, and progress notes.     Assessment and Plan:  Problem List Items Addressed This Visit       SVT (supraventricular tachycardia) ([facility])     S/p successful ablation. EKG NSR without ectopy. No events per patient. Will discontinue diltiazem per EP note. But advised pt to follow up with EP regarding chronic femoral access site pain radiating transverse lower abdomen, likely MSK-related as he only took 1 week off from working at [employer] (heavy lifting) post procedure. Work release light duty note provided until seen by EP.          Relevant Medications    carvedilol (Coreg) 12.5 mg tablet    Hypertension     Labile while on 4 agents. Will discontinue clonidine and dilt, start carvedilol 12.5mg BID and hydrochlorothiazide 12.5mg pending labs. Continue losartan 100mg and spironolactone 25mg.  He will continue to monitor BP at home and provide log in 4 weeks.           Relevant Medications    carvedilol (Coreg) 12.5 mg tablet    hydroCHLOROthiazide (Microzide) 12.5 mg tablet    Other Relevant Orders    Comprehensive Metabolic Panel    CBC and Auto Differential    Lipid panel    ECG 12 lead (Clinic Performed)    Coronary artery disease involving native coronary artery of native heart without angina pectoris - Primary     Nonobstructive, 50-60% proximal RCA lesion. Will continue to manage medically. Lipid panel pending. Continue atorvastatin 40mg.  Discuss ASA at next visit.          Relevant Medications    carvedilol (Coreg) 12.5 mg tablet    Other Relevant Orders    ECG 12 lead (Clinic Performed)    WPW (Karime-Parkinson-White  syndrome)    Relevant Medications    carvedilol (Coreg) 12.5 mg tablet    Hyperlipidemia     Lipid panel pending. Continue high-intensity statin.          Smoker     Provided about 8 minutes of tobacco cessation counseling. He is motivated to quit, has used NRT in the past with success. Will continue to assess at each visit.            I spent 50 minutes of face-to-face time with the patient, with more than 50% of that time spent in counseling and/or coordination of care.    Follow up in 4 weeks.     Amarilys Guevara DNP, APRN-CNP  Clinician, [facility]  Lead Clinician, [facility]

## 2025-03-04 LAB
ALBUMIN SERPL-MCNC: 4.8 G/DL (ref 3.6–5.1)
ALP SERPL-CCNC: 55 U/L (ref 35–144)
ALT SERPL-CCNC: 28 U/L (ref 9–46)
ANION GAP SERPL CALCULATED.4IONS-SCNC: 12 MMOL/L (CALC) (ref 7–17)
AST SERPL-CCNC: 22 U/L (ref 10–35)
BASOPHILS # BLD AUTO: 28 CELLS/UL (ref 0–200)
BASOPHILS NFR BLD AUTO: 0.4 %
BILIRUB SERPL-MCNC: 0.4 MG/DL (ref 0.2–1.2)
BUN SERPL-MCNC: 11 MG/DL (ref 7–25)
CALCIUM SERPL-MCNC: 9.4 MG/DL (ref 8.6–10.3)
CHLORIDE SERPL-SCNC: 108 MMOL/L (ref 98–110)
CHOLEST SERPL-MCNC: 154 MG/DL
CHOLEST/HDLC SERPL: 2.4 (CALC)
CO2 SERPL-SCNC: 22 MMOL/L (ref 20–32)
CREAT SERPL-MCNC: 1.18 MG/DL (ref 0.7–1.3)
EGFRCR SERPLBLD CKD-EPI 2021: 75 ML/MIN/1.73M2
EOSINOPHIL # BLD AUTO: 28 CELLS/UL (ref 15–500)
EOSINOPHIL NFR BLD AUTO: 0.4 %
ERYTHROCYTE [DISTWIDTH] IN BLOOD BY AUTOMATED COUNT: 14.4 % (ref 11–15)
GLUCOSE SERPL-MCNC: 83 MG/DL (ref 65–99)
HCT VFR BLD AUTO: 46 % (ref 38.5–50)
HDLC SERPL-MCNC: 64 MG/DL
HGB BLD-MCNC: 15.4 G/DL (ref 13.2–17.1)
LDLC SERPL CALC-MCNC: 77 MG/DL (CALC)
LYMPHOCYTES # BLD AUTO: 1449 CELLS/UL (ref 850–3900)
LYMPHOCYTES NFR BLD AUTO: 20.7 %
MCH RBC QN AUTO: 30.5 PG (ref 27–33)
MCHC RBC AUTO-ENTMCNC: 33.5 G/DL (ref 32–36)
MCV RBC AUTO: 91.1 FL (ref 80–100)
MONOCYTES # BLD AUTO: 819 CELLS/UL (ref 200–950)
MONOCYTES NFR BLD AUTO: 11.7 %
NEUTROPHILS # BLD AUTO: 4676 CELLS/UL (ref 1500–7800)
NEUTROPHILS NFR BLD AUTO: 66.8 %
NONHDLC SERPL-MCNC: 90 MG/DL (CALC)
PLATELET # BLD AUTO: 237 THOUSAND/UL (ref 140–400)
PMV BLD REES-ECKER: 11.3 FL (ref 7.5–12.5)
POTASSIUM SERPL-SCNC: 4.8 MMOL/L (ref 3.5–5.3)
PROT SERPL-MCNC: 7.4 G/DL (ref 6.1–8.1)
RBC # BLD AUTO: 5.05 MILLION/UL (ref 4.2–5.8)
SODIUM SERPL-SCNC: 142 MMOL/L (ref 135–146)
TRIGL SERPL-MCNC: 54 MG/DL
WBC # BLD AUTO: 7 THOUSAND/UL (ref 3.8–10.8)

## 2025-04-21 ENCOUNTER — APPOINTMENT (OUTPATIENT)
Dept: CARDIOLOGY | Facility: CLINIC | Age: 50
End: 2025-04-21
Payer: COMMERCIAL

## 2025-04-28 PROCEDURE — RXMED WILLOW AMBULATORY MEDICATION CHARGE

## 2025-05-01 ENCOUNTER — PHARMACY VISIT (OUTPATIENT)
Dept: PHARMACY | Facility: CLINIC | Age: 50
End: 2025-05-01
Payer: COMMERCIAL

## 2025-05-05 ENCOUNTER — OFFICE VISIT (OUTPATIENT)
Dept: CARDIOLOGY | Facility: CLINIC | Age: 50
End: 2025-05-05
Payer: COMMERCIAL

## 2025-05-05 VITALS
TEMPERATURE: 98.2 F | WEIGHT: 204.2 LBS | OXYGEN SATURATION: 98 % | BODY MASS INDEX: 30.16 KG/M2 | RESPIRATION RATE: 16 BRPM | HEART RATE: 75 BPM | SYSTOLIC BLOOD PRESSURE: 120 MMHG | DIASTOLIC BLOOD PRESSURE: 86 MMHG

## 2025-05-05 DIAGNOSIS — I25.10 CORONARY ARTERY DISEASE INVOLVING NATIVE CORONARY ARTERY OF NATIVE HEART WITHOUT ANGINA PECTORIS: ICD-10-CM

## 2025-05-05 DIAGNOSIS — I10 PRIMARY HYPERTENSION: Primary | ICD-10-CM

## 2025-05-05 DIAGNOSIS — E78.2 MIXED HYPERLIPIDEMIA: ICD-10-CM

## 2025-05-05 PROCEDURE — 3074F SYST BP LT 130 MM HG: CPT | Performed by: REGISTERED NURSE

## 2025-05-05 PROCEDURE — 3079F DIAST BP 80-89 MM HG: CPT | Performed by: REGISTERED NURSE

## 2025-05-05 PROCEDURE — 99214 OFFICE O/P EST MOD 30 MIN: CPT | Performed by: REGISTERED NURSE

## 2025-05-05 RX ORDER — EZETIMIBE/ATORVASTATIN CALCIUM 10 MG-40MG
1 TABLET ORAL DAILY
Qty: 90 TABLET | Refills: 3 | Status: SHIPPED | OUTPATIENT
Start: 2025-05-05 | End: 2026-05-05

## 2025-05-05 ASSESSMENT — ENCOUNTER SYMPTOMS
CLAUDICATION: 0
ORTHOPNEA: 0
DYSPNEA ON EXERTION: 0
OCCASIONAL FEELINGS OF UNSTEADINESS: 0
DEPRESSION: 0
SYNCOPE: 0
IRREGULAR HEARTBEAT: 0
NEAR-SYNCOPE: 0
PND: 0
WEIGHT GAIN: 0
LOSS OF SENSATION IN FEET: 0
WEIGHT LOSS: 0
NIGHT SWEATS: 0
PALPITATIONS: 0

## 2025-05-05 ASSESSMENT — PAIN SCALES - GENERAL: PAINLEVEL_OUTOF10: 2

## 2025-05-05 NOTE — ASSESSMENT & PLAN NOTE
LDL 77, needs more aggressive control in light of nonobstructive CAD. LDL<55 ideally.   Will start ezetimibe 10mg.

## 2025-05-05 NOTE — PROGRESS NOTES
Cardiology Clinic Note    Reason for Visit: Follow-up.  Referring Clinician: No ref. provider found     History of Present Illness: Petr Baptiste is a 49 y.o. male who presents for Follow-up. His PMHx relevant for SVT/WPW s/p ablation in November, prediabetes, nonobstructive CAD (50-60% proximal RCA lesion), labile HTN on 4 agents including carvedilol 12.5mg BID, hydrochlorothiazide 12.5 mg, losartan 100mg, spironolactone 25mg. His blood pressure is much better controlled since last visit.   He denies syncopal events, palpitations, dyspnea on exertion, chest discomfort, orthopnea, PND, fatigue.   His groin site from the ablation is feeling much better.     Past Medical History:  He has no past medical history on file.    Past Surgical History:  He has a past surgical history that includes Cardiac electrophysiology procedure (N/A, 11/5/2024).    Social History:  He reports that he has been smoking cigarettes. He started smoking about 33 years ago. He has a 32.7 pack-year smoking history. He has been exposed to tobacco smoke. He has never used smokeless tobacco. He reports current alcohol use of about 2.0 standard drinks of alcohol per week. He reports current drug use. Drug: Marijuana.    Family History:  No family history on file.    Allergies:  Penicillins    Outpatient Medications:  Current Outpatient Medications   Medication Instructions    atorvastatin (LIPITOR) 40 mg, oral, Nightly    carvedilol (COREG) 12.5 mg, oral, 2 times daily (morning and late afternoon)    hydroCHLOROthiazide (MICROZIDE) 12.5 mg, oral, Daily    losartan (COZAAR) 100 mg, oral, Daily    spironolactone (ALDACTONE) 25 mg, oral, Daily       Review of Systems:  Review of Systems   Constitutional: Negative for malaise/fatigue, night sweats, weight gain and weight loss.   Cardiovascular:  Negative for chest pain, claudication, cyanosis, dyspnea on exertion, irregular heartbeat, leg swelling, near-syncope, orthopnea, palpitations, paroxysmal  "nocturnal dyspnea and syncope.   All other systems reviewed and are negative.      Last Recorded Vitals:  Vitals:    05/05/25 1142   BP: 120/86   Pulse: 75   Resp: 16   Temp: 36.8 °C (98.2 °F)   SpO2: 98%   Weight: 92.6 kg (204 lb 3.2 oz)       Physical Examination:  GENERAL: NAD, central adiposity   HEENT: no JVD  CV: RRR without m/r/g  PULM: CTAB  EXT: non-edematous bilateral lower extremities  Femoral access site no hematomas, discoloration, but markedly TTP     Laboratory Studies:  Lab Results   Component Value Date    GLUCOSE 83 03/03/2025    CALCIUM 9.4 03/03/2025     03/03/2025    K 4.8 03/03/2025    CO2 22 03/03/2025     03/03/2025    BUN 11 03/03/2025    CREATININE 1.18 03/03/2025     Lab Results   Component Value Date    ALT 28 03/03/2025    AST 22 03/03/2025    ALKPHOS 55 03/03/2025    BILITOT 0.4 03/03/2025         Lab Results   Component Value Date    CHOL 154 03/03/2025    CHOL 142 07/29/2024    CHOL 248 (H) 06/17/2024     Lab Results   Component Value Date    HDL 64 03/03/2025    HDL 50.6 07/29/2024    HDL 56.7 06/17/2024     Lab Results   Component Value Date    LDLCALC 77 03/03/2025    LDLCALC 81 07/29/2024    LDLCALC 174 (H) 06/17/2024     Lab Results   Component Value Date    TRIG 54 03/03/2025    TRIG 53 07/29/2024    TRIG 89 06/17/2024     No components found for: \"CHOLHDL\"  Lab Results   Component Value Date    HGBA1C 5.9 (H) 06/17/2024     No components found for: \"UACR\"    Cardiology Tests:  ECG:  ECG 12 lead (Clinic Performed) 12/03/2024      Echo:  Transthoracic Echo (TTE) Complete 06/17/2024      Cath:No results found for this or any previous visit from the past 1095 days.      Stress Test:No results found for this or any previous visit from the past 1095 days.      Cardiac Imaging:  CT angio coronary art with heartflow if score >30% 06/17/2024      The 10-year ASCVD risk score (Mily ROBISON, et al., 2019) is: 11.3%    Values used to calculate the score:      Age: 49 years      " Sex: Male      Is Non- : Yes      Diabetic: No      Tobacco smoker: Yes      Systolic Blood Pressure: 120 mmHg      Is BP treated: Yes      HDL Cholesterol: 64 mg/dL      Total Cholesterol: 154 mg/dL   I personally reviewed labs, diagnostic imaging, and progress notes.     Assessment and Plan:  Problem List Items Addressed This Visit       Hypertension - Primary    Much better control, goal BP <130/80.  Home blood pressures are typically around 110's/70s. Remain on carvedilol 12.5mg BID, hydrochlorothiazide 12.5 mg, losartan 100mg, and spironolactone 25mg,   Continue current med regimen          Coronary artery disease involving native coronary artery of native heart without angina pectoris    Nonobstructive, 50-60% proximal RCA lesion. Will continue to manage medically. LDL 77. Continue atorvastatin 40mg, start ASA 81mg and ezetimibe 10mg.          Hyperlipidemia    LDL 77, needs more aggressive control in light of nonobstructive CAD. LDL<55 ideally.   Will start ezetimibe 10mg.              I spent 50 minutes of face-to-face time with the patient, with more than 50% of that time spent in counseling and/or coordination of care.    Follow up in 1 year or PRN     Amarilys Guevara, ABBY, APRN-CNP  Clinician, [facility]  Lead Clinician, [facility]

## 2025-05-05 NOTE — ASSESSMENT & PLAN NOTE
Nonobstructive, 50-60% proximal RCA lesion. Will continue to manage medically. LDL 77. Continue atorvastatin 40mg, start ASA 81mg and ezetimibe 10mg.

## 2025-05-05 NOTE — ASSESSMENT & PLAN NOTE
Much better control, goal BP <130/80.  Home blood pressures are typically around 110's/70s. Remain on carvedilol 12.5mg BID, hydrochlorothiazide 12.5 mg, losartan 100mg, and spironolactone 25mg,   Continue current med regimen

## (undated) DEVICE — CABLE, CATH TO CARTO SYSTEM, 12 HYP/10 REDEL (REPROCESSED)

## (undated) DEVICE — PAD, ELECTRODE DEFIB PADPRO ADULT STRL W/ADAPTER

## (undated) DEVICE — PATCHES, EXTERNAL REFERENCE, CARTO3

## (undated) DEVICE — NEEDLE, NRG TRANSSEPTAL, 98CM, CURVE C0

## (undated) DEVICE — SHEATH, STEERABLE, SUREFLEX, MEDIUM CURVE

## (undated) DEVICE — INTRODUCER, SHEATH, FAST-CATH, 7FR X 12CM, C-LOCK

## (undated) DEVICE — CABLE, 34 HYP, 34 LEMO, 10FT, SMART TOUCH

## (undated) DEVICE — CATHETER, DIAGNOSTIC, SOUNDSTAR ECO SMS, 8FR

## (undated) DEVICE — CATHETER, THERMOCOOL SMART TOUCH, SF, D-F CURVE

## (undated) DEVICE — CATHETER, QUADRAPOLAR, 2-5-2MM, 115CM, YELLOW, W/ REDEL

## (undated) DEVICE — DEVICE, CLOSURE, PERCLOSE, PROSTYLE

## (undated) DEVICE — CATHETHER, CS, BI-DIRECTIONAL, 10 POLES, D-F TYPE

## (undated) DEVICE — TUBING SET, IRRIGATION, SMARTABLATE

## (undated) DEVICE — INTRODUCER, HEMO, FAST-CATH, 8.5 FR X 63 CM, SR0 038GW, G2

## (undated) DEVICE — INTRODUCER, HEMOSTASIS, STR/J .038 IN, 8.5FR 12CM

## (undated) DEVICE — INTERFACE CABLE, EXISITING DX CATHETERS, BLUE PORT (REPROCESS)

## (undated) DEVICE — CATHETER, PARVINS, STEERABLE TIP, 7FR, D-CURVE, OCTOPOLAR

## (undated) DEVICE — INTRODUCER, SHEATH, FAST-CATH, 8FR X 12CM, C-LOCK

## (undated) DEVICE — CABLE, CONNECTOR, 10FT